# Patient Record
Sex: FEMALE | Race: WHITE | NOT HISPANIC OR LATINO | Employment: FULL TIME | URBAN - METROPOLITAN AREA
[De-identification: names, ages, dates, MRNs, and addresses within clinical notes are randomized per-mention and may not be internally consistent; named-entity substitution may affect disease eponyms.]

---

## 2023-04-12 LAB
ALANINE AMINOTRANSFERASE (SGPT) (U/L) IN SER/PLAS: 54 U/L (ref 7–45)
ALBUMIN (G/DL) IN SER/PLAS: 4.1 G/DL (ref 3.4–5)
ALKALINE PHOSPHATASE (U/L) IN SER/PLAS: 61 U/L (ref 33–110)
ASPARTATE AMINOTRANSFERASE (SGOT) (U/L) IN SER/PLAS: 30 U/L (ref 9–39)
BILIRUBIN DIRECT (MG/DL) IN SER/PLAS: 0.1 MG/DL (ref 0–0.3)
BILIRUBIN TOTAL (MG/DL) IN SER/PLAS: 0.5 MG/DL (ref 0–1.2)
CHOLESTEROL (MG/DL) IN SER/PLAS: 177 MG/DL (ref 0–199)
CHOLESTEROL IN HDL (MG/DL) IN SER/PLAS: 58.4 MG/DL
CHOLESTEROL/HDL RATIO: 3
LDL: 94 MG/DL (ref 0–99)
PROTEIN TOTAL: 6.9 G/DL (ref 6.4–8.2)
TRIGLYCERIDE (MG/DL) IN SER/PLAS: 125 MG/DL (ref 0–149)
VLDL: 25 MG/DL (ref 0–40)

## 2023-04-16 DIAGNOSIS — E03.9 HYPOTHYROIDISM, UNSPECIFIED: ICD-10-CM

## 2023-04-17 ENCOUNTER — OFFICE VISIT (OUTPATIENT)
Dept: PRIMARY CARE | Facility: CLINIC | Age: 58
End: 2023-04-17
Payer: COMMERCIAL

## 2023-04-17 VITALS
DIASTOLIC BLOOD PRESSURE: 86 MMHG | BODY MASS INDEX: 32.64 KG/M2 | HEIGHT: 64 IN | HEART RATE: 81 BPM | WEIGHT: 191.2 LBS | OXYGEN SATURATION: 97 % | SYSTOLIC BLOOD PRESSURE: 160 MMHG

## 2023-04-17 DIAGNOSIS — I10 PRIMARY HYPERTENSION: ICD-10-CM

## 2023-04-17 DIAGNOSIS — E78.2 MIXED HYPERLIPIDEMIA: ICD-10-CM

## 2023-04-17 DIAGNOSIS — I25.118 CORONARY ARTERY DISEASE OF NATIVE ARTERY OF NATIVE HEART WITH STABLE ANGINA PECTORIS (CMS-HCC): Primary | ICD-10-CM

## 2023-04-17 DIAGNOSIS — H69.92 DYSFUNCTION OF LEFT EUSTACHIAN TUBE: ICD-10-CM

## 2023-04-17 PROBLEM — G47.30 SLEEP APNEA, UNSPECIFIED: Status: ACTIVE | Noted: 2023-04-17

## 2023-04-17 PROBLEM — J45.909 REACTIVE AIRWAY DISEASE, UNSPECIFIED ASTHMA SEVERITY, UNCOMPLICATED (HHS-HCC): Status: ACTIVE | Noted: 2023-04-17

## 2023-04-17 PROBLEM — D69.6 THROMBOCYTOPENIA (CMS-HCC): Status: ACTIVE | Noted: 2023-04-17

## 2023-04-17 PROBLEM — J30.9 ALLERGIC RHINITIS: Status: ACTIVE | Noted: 2023-04-17

## 2023-04-17 PROBLEM — F32.A DEPRESSION: Status: ACTIVE | Noted: 2023-04-17

## 2023-04-17 PROBLEM — E78.00 HYPERCHOLESTEROLEMIA: Status: ACTIVE | Noted: 2023-04-17

## 2023-04-17 PROBLEM — I20.9 ANGINA PECTORIS (CMS-HCC): Status: ACTIVE | Noted: 2023-04-17

## 2023-04-17 PROBLEM — E03.9 HYPOTHYROIDISM: Status: ACTIVE | Noted: 2023-04-17

## 2023-04-17 PROBLEM — B00.2 ORAL HERPES: Status: ACTIVE | Noted: 2023-04-17

## 2023-04-17 PROBLEM — E55.9 VITAMIN D DEFICIENCY: Status: ACTIVE | Noted: 2023-04-17

## 2023-04-17 PROBLEM — E78.5 HYPERLIPIDEMIA: Status: ACTIVE | Noted: 2023-04-17

## 2023-04-17 PROBLEM — I45.10 INCOMPLETE RBBB: Status: ACTIVE | Noted: 2023-04-17

## 2023-04-17 PROBLEM — M1A.9XX0 CHRONIC GOUT: Status: ACTIVE | Noted: 2023-04-17

## 2023-04-17 PROCEDURE — 1036F TOBACCO NON-USER: CPT | Performed by: INTERNAL MEDICINE

## 2023-04-17 PROCEDURE — 3077F SYST BP >= 140 MM HG: CPT | Performed by: INTERNAL MEDICINE

## 2023-04-17 PROCEDURE — 3079F DIAST BP 80-89 MM HG: CPT | Performed by: INTERNAL MEDICINE

## 2023-04-17 PROCEDURE — 99214 OFFICE O/P EST MOD 30 MIN: CPT | Performed by: INTERNAL MEDICINE

## 2023-04-17 RX ORDER — LISINOPRIL AND HYDROCHLOROTHIAZIDE 20; 25 MG/1; MG/1
1 TABLET ORAL DAILY
COMMUNITY
End: 2023-10-20 | Stop reason: SDUPTHER

## 2023-04-17 RX ORDER — LEVOTHYROXINE SODIUM 50 UG/1
50 TABLET ORAL DAILY
COMMUNITY
End: 2023-04-24 | Stop reason: SDUPTHER

## 2023-04-17 RX ORDER — ROSUVASTATIN CALCIUM 10 MG/1
10 TABLET, COATED ORAL DAILY
COMMUNITY
Start: 2023-04-15 | End: 2023-04-17 | Stop reason: SDUPTHER

## 2023-04-17 RX ORDER — ISOSORBIDE MONONITRATE 30 MG/1
15 TABLET, EXTENDED RELEASE ORAL DAILY
COMMUNITY
End: 2024-04-08 | Stop reason: ALTCHOICE

## 2023-04-17 RX ORDER — CLOTRIMAZOLE AND BETAMETHASONE DIPROPIONATE 10; .64 MG/G; MG/G
1 CREAM TOPICAL 2 TIMES DAILY
COMMUNITY
Start: 2022-06-02 | End: 2023-04-17 | Stop reason: ALTCHOICE

## 2023-04-17 RX ORDER — NITROGLYCERIN 0.4 MG/1
0.4 TABLET SUBLINGUAL EVERY 5 MIN PRN
COMMUNITY
Start: 2020-04-01

## 2023-04-17 RX ORDER — TICAGRELOR 90 MG/1
1 TABLET ORAL 2 TIMES DAILY
COMMUNITY
Start: 2019-10-10 | End: 2023-04-17 | Stop reason: ALTCHOICE

## 2023-04-17 RX ORDER — ACETAMINOPHEN 500 MG
125 TABLET ORAL DAILY
COMMUNITY

## 2023-04-17 RX ORDER — ROSUVASTATIN CALCIUM 20 MG/1
20 TABLET, COATED ORAL DAILY
Qty: 90 TABLET | Refills: 3 | Status: SHIPPED | OUTPATIENT
Start: 2023-04-17 | End: 2023-10-14

## 2023-04-17 RX ORDER — ATORVASTATIN CALCIUM 10 MG/1
10 TABLET, FILM COATED ORAL NIGHTLY
COMMUNITY
End: 2023-04-17 | Stop reason: ALTCHOICE

## 2023-04-17 RX ORDER — FLUTICASONE PROPIONATE 50 MCG
2 SPRAY, SUSPENSION (ML) NASAL DAILY
COMMUNITY

## 2023-04-17 RX ORDER — AMOXICILLIN 500 MG/1
2000 CAPSULE ORAL AS NEEDED
COMMUNITY
Start: 2022-12-06 | End: 2023-11-02 | Stop reason: WASHOUT

## 2023-04-17 RX ORDER — VALACYCLOVIR HYDROCHLORIDE 1 G/1
2000 TABLET, FILM COATED ORAL 2 TIMES DAILY
COMMUNITY
Start: 2023-01-22 | End: 2023-11-02 | Stop reason: WASHOUT

## 2023-04-17 RX ORDER — EPINEPHRINE 0.22MG
1 AEROSOL WITH ADAPTER (ML) INHALATION 2 TIMES DAILY
COMMUNITY
Start: 2018-08-31

## 2023-04-17 RX ORDER — ROSUVASTATIN CALCIUM 10 MG/1
20 TABLET, COATED ORAL DAILY
Qty: 90 TABLET | Refills: 3 | Status: SHIPPED | OUTPATIENT
Start: 2023-04-17 | End: 2023-04-17 | Stop reason: ALTCHOICE

## 2023-04-17 ASSESSMENT — ENCOUNTER SYMPTOMS
FATIGUE: 0
VOMITING: 0
DIARRHEA: 0
DYSURIA: 0
ARTHRALGIAS: 0
COUGH: 0
SORE THROAT: 0
FREQUENCY: 0
CONSTIPATION: 0
PALPITATIONS: 0
LIGHT-HEADEDNESS: 0
MYALGIAS: 0
SHORTNESS OF BREATH: 0
CHILLS: 0
DIFFICULTY URINATING: 0
HEADACHES: 0
NAUSEA: 0
DIZZINESS: 0
WEAKNESS: 0
HEMATURIA: 0
FEVER: 0

## 2023-04-17 ASSESSMENT — PATIENT HEALTH QUESTIONNAIRE - PHQ9
SUM OF ALL RESPONSES TO PHQ9 QUESTIONS 1 AND 2: 0
1. LITTLE INTEREST OR PLEASURE IN DOING THINGS: NOT AT ALL
2. FEELING DOWN, DEPRESSED OR HOPELESS: NOT AT ALL

## 2023-04-17 ASSESSMENT — PAIN SCALES - GENERAL: PAINLEVEL: 2

## 2023-04-17 NOTE — ASSESSMENT & PLAN NOTE
Poor blood pressure control today.  Patient did take her meds this morning but says she was anxious because of all the construction on [address] getting to my office.  She also says she is only had half as much coffee is normal.  At this time I would like her to check her blood pressure readings a few times per week and send them to me in 2 weeks to make sure she is not consistently at this level.  Patient agrees and will send me her blood pressure readings from home so we can see if we need to adjust her meds

## 2023-04-17 NOTE — PROGRESS NOTES
Subjective   Patient ID: Kennedi Machado is a 57 y.o. female who presents for 4 month re for HTN management and patient state she want her left ear rechecked because the eusatachian tube is bulging causing issues.  BN    Patient is here today for routine follow-up on her hypertension, heart disease cholesterol and medication management.  At her last appointment we stopped atorvastatin and started her on rosuvastatin 10 mg daily.  Not only has her cholesterol improved but she no longer has the leg cramps and is actually feeling better.  Labs were reviewed with the patient and her LDL is down to 94.  Although improved it is still not yet at goal since she has known heart disease        Review of Systems   Constitutional:  Negative for chills, fatigue and fever.   HENT:  Negative for sore throat.         Patient complains of fullness in the left ear and a popping sensation.  When she tries to pop her eardrums she cannot do it on the left side.   Eyes:  Negative for visual disturbance.   Respiratory:  Negative for cough and shortness of breath.    Cardiovascular:  Negative for chest pain, palpitations and leg swelling.   Gastrointestinal:  Negative for constipation, diarrhea, nausea and vomiting.   Genitourinary:  Negative for difficulty urinating, dysuria, frequency, hematuria and urgency.   Musculoskeletal:  Negative for arthralgias and myalgias.   Skin:  Negative for rash.   Neurological:  Negative for dizziness, syncope, weakness, light-headedness and headaches.       Objective   Physical Exam  Constitutional:       Appearance: Normal appearance.   HENT:      Head: Normocephalic and atraumatic.      Nose: Nose normal.   Eyes:      Extraocular Movements: Extraocular movements intact.      Pupils: Pupils are equal, round, and reactive to light.   Cardiovascular:      Rate and Rhythm: Normal rate and regular rhythm.   Pulmonary:      Breath sounds: Normal breath sounds.   Abdominal:      General: Abdomen is flat. Bowel  "sounds are normal.      Palpations: Abdomen is soft.   Musculoskeletal:      Right lower leg: No edema.      Left lower leg: No edema.   Neurological:      Mental Status: She is alert.     /86 (BP Location: Left arm, Patient Position: Sitting)   Pulse 81   Ht 1.613 m (5' 3.5\")   Wt 86.7 kg (191 lb 3.2 oz)   SpO2 97%   BMI 33.34 kg/m²       Assessment/Plan   Problem List Items Addressed This Visit          Circulatory    Coronary artery disease of native heart with stable angina pectoris (CMS/HCC) - Primary     Patient has known heart disease and we are working diligently to get her blood pressure down and her cholesterol to goal.  So far she is tolerating the higher dose of Crestor or rosuvastatin but I would like to push it to see if we can get her down below an LDL of 70 or 75         Relevant Medications    isosorbide mononitrate ER (Imdur) 30 mg 24 hr tablet    nitroglycerin (Nitrostat) 0.4 mg SL tablet    rosuvastatin (Crestor) 20 mg tablet    Other Relevant Orders    Lipid Panel    Hepatic Function Panel    Hypertension     Poor blood pressure control today.  Patient did take her meds this morning but says she was anxious because of all the construction on [address] getting to my office.  She also says she is only had half as much coffee is normal.  At this time I would like her to check her blood pressure readings a few times per week and send them to me in 2 weeks to make sure she is not consistently at this level.  Patient agrees and will send me her blood pressure readings from home so we can see if we need to adjust her meds            Other    Hyperlipidemia    Relevant Medications    rosuvastatin (Crestor) 20 mg tablet    Other Relevant Orders    Lipid Panel    Hepatic Function Panel    Dysfunction of left eustachian tube              It has been a pleasure seeing you.  "

## 2023-04-17 NOTE — PATIENT INSTRUCTIONS
Check bp's at home and send to Dr DIXON in 2 weeks    Increase crestor to 20 mg a day     Repeat fasting labs in 3 months    Follow up Dr Sauceda in 4 months    Try Astapro nasal spray 2 puffs each nostril at night and flonase in AM

## 2023-04-17 NOTE — ASSESSMENT & PLAN NOTE
Patient has known heart disease and we are working diligently to get her blood pressure down and her cholesterol to goal.  So far she is tolerating the higher dose of Crestor or rosuvastatin but I would like to push it to see if we can get her down below an LDL of 70 or 75

## 2023-04-24 DIAGNOSIS — E03.9 HYPOTHYROIDISM, UNSPECIFIED TYPE: Primary | ICD-10-CM

## 2023-04-24 RX ORDER — LEVOTHYROXINE SODIUM 50 UG/1
50 TABLET ORAL DAILY
Qty: 90 TABLET | Refills: 0 | Status: SHIPPED | OUTPATIENT
Start: 2023-04-24 | End: 2023-07-24

## 2023-05-11 RX ORDER — LEVOTHYROXINE SODIUM 50 UG/1
TABLET ORAL
Qty: 30 TABLET | Refills: 11 | OUTPATIENT
Start: 2023-05-11

## 2023-06-20 ENCOUNTER — OFFICE (OUTPATIENT)
Dept: URBAN - METROPOLITAN AREA CLINIC 27 | Facility: CLINIC | Age: 58
End: 2023-06-20

## 2023-06-20 VITALS
HEART RATE: 72 BPM | SYSTOLIC BLOOD PRESSURE: 151 MMHG | WEIGHT: 194 LBS | DIASTOLIC BLOOD PRESSURE: 72 MMHG | HEIGHT: 62 IN | TEMPERATURE: 97.9 F

## 2023-06-20 DIAGNOSIS — K21.9 GASTRO-ESOPHAGEAL REFLUX DISEASE WITHOUT ESOPHAGITIS: ICD-10-CM

## 2023-06-20 DIAGNOSIS — Z86.010 PERSONAL HISTORY OF COLONIC POLYPS: ICD-10-CM

## 2023-06-20 DIAGNOSIS — Z80.0 FAMILY HISTORY OF MALIGNANT NEOPLASM OF DIGESTIVE ORGANS: ICD-10-CM

## 2023-06-20 PROCEDURE — 99214 OFFICE O/P EST MOD 30 MIN: CPT | Performed by: INTERNAL MEDICINE

## 2023-07-23 DIAGNOSIS — E03.9 HYPOTHYROIDISM, UNSPECIFIED TYPE: ICD-10-CM

## 2023-07-24 RX ORDER — LEVOTHYROXINE SODIUM 50 UG/1
50 TABLET ORAL DAILY
Qty: 30 TABLET | Refills: 0 | Status: SHIPPED | OUTPATIENT
Start: 2023-07-24 | End: 2023-08-18

## 2023-07-29 ENCOUNTER — LAB (OUTPATIENT)
Dept: LAB | Facility: LAB | Age: 58
End: 2023-07-29
Payer: COMMERCIAL

## 2023-07-29 DIAGNOSIS — E78.2 MIXED HYPERLIPIDEMIA: ICD-10-CM

## 2023-07-29 DIAGNOSIS — I25.118 CORONARY ARTERY DISEASE OF NATIVE ARTERY OF NATIVE HEART WITH STABLE ANGINA PECTORIS (CMS-HCC): ICD-10-CM

## 2023-07-29 LAB
ALANINE AMINOTRANSFERASE (SGPT) (U/L) IN SER/PLAS: 49 U/L (ref 7–45)
ALBUMIN (G/DL) IN SER/PLAS: 4.1 G/DL (ref 3.4–5)
ALKALINE PHOSPHATASE (U/L) IN SER/PLAS: 66 U/L (ref 33–110)
ASPARTATE AMINOTRANSFERASE (SGOT) (U/L) IN SER/PLAS: 28 U/L (ref 9–39)
BILIRUBIN DIRECT (MG/DL) IN SER/PLAS: 0.1 MG/DL (ref 0–0.3)
BILIRUBIN TOTAL (MG/DL) IN SER/PLAS: 0.5 MG/DL (ref 0–1.2)
CHOLESTEROL (MG/DL) IN SER/PLAS: 163 MG/DL (ref 0–199)
CHOLESTEROL IN HDL (MG/DL) IN SER/PLAS: 55.4 MG/DL
CHOLESTEROL/HDL RATIO: 2.9
LDL: 79 MG/DL (ref 0–99)
PROTEIN TOTAL: 6.9 G/DL (ref 6.4–8.2)
TRIGLYCERIDE (MG/DL) IN SER/PLAS: 141 MG/DL (ref 0–149)
VLDL: 28 MG/DL (ref 0–40)

## 2023-07-29 PROCEDURE — 36415 COLL VENOUS BLD VENIPUNCTURE: CPT

## 2023-07-29 PROCEDURE — 80061 LIPID PANEL: CPT

## 2023-07-29 PROCEDURE — 80076 HEPATIC FUNCTION PANEL: CPT

## 2023-07-31 ENCOUNTER — TELEPHONE (OUTPATIENT)
Dept: PRIMARY CARE | Facility: CLINIC | Age: 58
End: 2023-07-31
Payer: COMMERCIAL

## 2023-07-31 NOTE — TELEPHONE ENCOUNTER
Patient notified and read message.  BN     ----- Message from Dea Sauceda MD sent at 7/31/2023  8:03 AM EDT -----  Please notify patient her cholesterol is better.  Her LDL or bad cholesterol have dropped from 94 down to 79 and her liver enzymes remain the same.  Please keep your appointment later in August and I will review this with you in detail in person.

## 2023-08-25 ENCOUNTER — OFFICE VISIT (OUTPATIENT)
Dept: PRIMARY CARE | Facility: CLINIC | Age: 58
End: 2023-08-25
Payer: COMMERCIAL

## 2023-08-25 VITALS
HEIGHT: 63 IN | WEIGHT: 190.8 LBS | SYSTOLIC BLOOD PRESSURE: 127 MMHG | HEART RATE: 66 BPM | BODY MASS INDEX: 33.81 KG/M2 | OXYGEN SATURATION: 97 % | DIASTOLIC BLOOD PRESSURE: 64 MMHG

## 2023-08-25 DIAGNOSIS — Z12.31 SCREENING MAMMOGRAM FOR BREAST CANCER: ICD-10-CM

## 2023-08-25 DIAGNOSIS — F32.9 CURRENT EPISODE OF MAJOR DEPRESSIVE DISORDER WITHOUT PRIOR EPISODE, UNSPECIFIED DEPRESSION EPISODE SEVERITY: ICD-10-CM

## 2023-08-25 DIAGNOSIS — Z00.00 ANNUAL PHYSICAL EXAM: Primary | ICD-10-CM

## 2023-08-25 DIAGNOSIS — E78.2 MIXED HYPERLIPIDEMIA: ICD-10-CM

## 2023-08-25 DIAGNOSIS — I25.118 CORONARY ARTERY DISEASE OF NATIVE ARTERY OF NATIVE HEART WITH STABLE ANGINA PECTORIS (CMS-HCC): ICD-10-CM

## 2023-08-25 DIAGNOSIS — H69.92 DYSFUNCTION OF LEFT EUSTACHIAN TUBE: ICD-10-CM

## 2023-08-25 DIAGNOSIS — M1A.9XX0 CHRONIC GOUT WITHOUT TOPHUS, UNSPECIFIED CAUSE, UNSPECIFIED SITE: ICD-10-CM

## 2023-08-25 DIAGNOSIS — E03.9 HYPOTHYROIDISM, UNSPECIFIED TYPE: ICD-10-CM

## 2023-08-25 DIAGNOSIS — H69.92 CHRONIC DYSFUNCTION OF LEFT EUSTACHIAN TUBE: ICD-10-CM

## 2023-08-25 DIAGNOSIS — I10 PRIMARY HYPERTENSION: ICD-10-CM

## 2023-08-25 PROCEDURE — 3074F SYST BP LT 130 MM HG: CPT | Performed by: INTERNAL MEDICINE

## 2023-08-25 PROCEDURE — 99214 OFFICE O/P EST MOD 30 MIN: CPT | Performed by: INTERNAL MEDICINE

## 2023-08-25 PROCEDURE — 1036F TOBACCO NON-USER: CPT | Performed by: INTERNAL MEDICINE

## 2023-08-25 PROCEDURE — 3078F DIAST BP <80 MM HG: CPT | Performed by: INTERNAL MEDICINE

## 2023-08-25 ASSESSMENT — ENCOUNTER SYMPTOMS
WEAKNESS: 0
FREQUENCY: 0
CHILLS: 0
DIARRHEA: 0
HEMATURIA: 0
COUGH: 0
PALPITATIONS: 0
DIFFICULTY URINATING: 0
MYALGIAS: 0
SHORTNESS OF BREATH: 0
LIGHT-HEADEDNESS: 0
ARTHRALGIAS: 0
DYSURIA: 0
VOMITING: 0
CONSTIPATION: 0
NAUSEA: 0
FATIGUE: 0
SORE THROAT: 0
DIZZINESS: 0
HEADACHES: 0
FEVER: 0

## 2023-08-25 ASSESSMENT — PAIN SCALES - GENERAL: PAINLEVEL: 0-NO PAIN

## 2023-08-25 NOTE — PATIENT INSTRUCTIONS
Get fasting labs in Dec before next appointment    Get mammo now    Follow up Dr Sauceda in 4 months for HTN CAD etc

## 2023-08-25 NOTE — ASSESSMENT & PLAN NOTE
Cholesterol levels are also improving.  She is not at the LDL goal of 70 but it has clearly gone down so she is encouraged to continue working on low-fat diet and exercise

## 2023-08-25 NOTE — PROGRESS NOTES
Subjective   Patient ID: Kennedi Machado is a 57 y.o. female who presents for No chief complaint on file..  Patient is here today for routine follow-up hypertension heart disease and medication management.  Her only complaint is her left ear is causing pain again and she may thinks it may be related to eustachian tube dysfunction which she has had in the past but is concerned and wants to make sure it is not infected.        Review of Systems   Constitutional:  Negative for chills, fatigue and fever.   HENT:  Positive for ear pain. Negative for sore throat.    Eyes:  Negative for visual disturbance.   Respiratory:  Negative for cough and shortness of breath.    Cardiovascular:  Negative for chest pain, palpitations and leg swelling.   Gastrointestinal:  Negative for constipation, diarrhea, nausea and vomiting.   Genitourinary:  Negative for difficulty urinating, dysuria, frequency, hematuria and urgency.   Musculoskeletal:  Negative for arthralgias and myalgias.   Skin:  Negative for rash.   Neurological:  Negative for dizziness, syncope, weakness, light-headedness and headaches.       Objective   Medication Documentation Review Audit       Reviewed by Dea Sauceda MD (Physician) on 08/25/23 at 0831      Medication Order Taking? Sig Documenting Provider Last Dose Status   amoxicillin (Amoxil) 500 mg capsule [id number] No Take 4 capsules (2,000 mg) by mouth if needed. Take 60 min before procedure. Historical Provider, MD Taking Active   aspirin-calcium carbonate 81 mg-300 mg calcium(777 mg) tablet [id number] No Take by mouth. Historical Provider, MD Taking Active   cholecalciferol (Vitamin D-3) 5,000 Units tablet [id number] No Take 1 tablet (125 mcg) by mouth once daily. Historical Provider, MD Taking Active   coenzyme Q-10 100 mg capsule [id number] No Take 1 capsule (100 mg) by mouth in the morning and 1 capsule (100 mg) before bedtime. Historical Provider, MD Taking Active   fluticasone (Flonase) 50 mcg/actuation nasal  spray [id number] No Administer 2 sprays into each nostril once daily. Historical Provider, MD Taking Active   isosorbide mononitrate ER (Imdur) 30 mg 24 hr tablet [id number] No Take 1 tablet (30 mg) by mouth once daily. Historical Provider, MD Taking Active   levothyroxine (Synthroid, Levoxyl) 50 mcg tablet [id number]  TAKE 1 TABLET (50 MCG) BY MOUTH ONCE DAILY FOR 90 DOSES. Dea Sauceda MD  Active   lisinopriL-hydrochlorothiazide 20-25 mg tablet [id number] No Take 1 tablet by mouth once daily. Historical Provider, MD Taking Active   nitroglycerin (Nitrostat) 0.4 mg SL tablet [id number] No Place 1 tablet (0.4 mg) under the tongue every 5 minutes if needed. Historical Provider, MD Taking Active   rosuvastatin (Crestor) 20 mg tablet [id number]  Take 1 tablet (20 mg) by mouth once daily. Dea Sauceda MD  Active   valACYclovir (Valtrex) 1 gram tablet [id number] No Take 2 tablets (2,000 mg) by mouth in the morning and 2 tablets (2,000 mg) before bedtime. Take at first sign of lip lesion. Historical Provider, MD Taking Active                  Physical Exam  Constitutional:       Appearance: Normal appearance.   HENT:      Head: Normocephalic and atraumatic.      Right Ear: Tympanic membrane and ear canal normal.      Left Ear: Ear canal normal.      Ears:      Comments: Left tympanic membrane is clear shiny and has good left and anterior light reflex.  Eardrum has no erythema no air-fluid levels no evidence of infection but it is slightly bulging as if it is under pressure     Nose: Nose normal.   Eyes:      Extraocular Movements: Extraocular movements intact.      Pupils: Pupils are equal, round, and reactive to light.   Cardiovascular:      Rate and Rhythm: Normal rate and regular rhythm.   Pulmonary:      Breath sounds: Normal breath sounds.   Abdominal:      General: Abdomen is flat. Bowel sounds are normal.      Palpations: Abdomen is soft.   Musculoskeletal:      Right lower leg: No edema.      Left lower leg: No edema.  "  Neurological:      Mental Status: She is alert.     /64 (BP Location: Left arm, Patient Position: Sitting)   Pulse 66   Ht 1.6 m (5' 3\") Comment: w/o shoes  Wt 86.5 kg (190 lb 12.8 oz)   SpO2 97%   BMI 33.80 kg/m²       Assessment/Plan   Problem List Items Addressed This Visit       Chronic gout    Relevant Orders    Lipid Panel    CBC    Comprehensive Metabolic Panel    TSH with reflex to Free T4 if abnormal    Vitamin D, Total    Coronary artery disease of native heart with stable angina pectoris (CMS/HCC)     Patient sees her cardiologist today but denies angina or any chest pain pains or complaints.         Relevant Orders    Lipid Panel    CBC    Comprehensive Metabolic Panel    TSH with reflex to Free T4 if abnormal    Vitamin D, Total    Depression     Mood is stable and she is not on medications and appears to be doing well at this time         Relevant Orders    Lipid Panel    CBC    Comprehensive Metabolic Panel    TSH with reflex to Free T4 if abnormal    Vitamin D, Total    Hyperlipidemia     Cholesterol levels are also improving.  She is not at the LDL goal of 70 but it has clearly gone down so she is encouraged to continue working on low-fat diet and exercise         Relevant Orders    Lipid Panel    CBC    Comprehensive Metabolic Panel    TSH with reflex to Free T4 if abnormal    Vitamin D, Total    Hypertension     Hypertension is stable and very well controlled.  She needs no refills.         Relevant Orders    Lipid Panel    CBC    Comprehensive Metabolic Panel    TSH with reflex to Free T4 if abnormal    Vitamin D, Total    Hypothyroidism    Relevant Orders    Lipid Panel    CBC    Comprehensive Metabolic Panel    TSH with reflex to Free T4 if abnormal    Vitamin D, Total    Dysfunction of left eustachian tube    Chronic dysfunction of left eustachian tube     Patient has chronic left eustachian tube dysfunction.  She is already on Astepro and Flonase which I encouraged her to " continue taking.  She was told that if it hurts too much she could use Afrin nasal spray but I recommended that she does not use it more than 1 spray each nostril every third day.  I explained that this medication is highly addictive to the nasal mucosa and she says if the pain gets too bad she will consider that but otherwise she may just learned to live with it.  We also discussed seeing an allergist which she declines at this time but if it worsens will let me know          Other Visit Diagnoses       Annual physical exam    -  Primary    Relevant Orders    Lipid Panel    CBC    Comprehensive Metabolic Panel    TSH with reflex to Free T4 if abnormal    Vitamin D, Total    Screening mammogram for breast cancer        Relevant Orders    BI mammo bilateral screening tomosynthesis          Follow-up with me will be in 4 months with annual blood work prior to that appointment  Patient is due for mammogram now and was given an order           It has been a pleasure seeing you.

## 2023-08-25 NOTE — ASSESSMENT & PLAN NOTE
Patient has chronic left eustachian tube dysfunction.  She is already on Astepro and Flonase which I encouraged her to continue taking.  She was told that if it hurts too much she could use Afrin nasal spray but I recommended that she does not use it more than 1 spray each nostril every third day.  I explained that this medication is highly addictive to the nasal mucosa and she says if the pain gets too bad she will consider that but otherwise she may just learned to live with it.  We also discussed seeing an allergist which she declines at this time but if it worsens will let me know

## 2023-10-09 ENCOUNTER — ANCILLARY PROCEDURE (OUTPATIENT)
Dept: RADIOLOGY | Facility: CLINIC | Age: 58
End: 2023-10-09
Payer: COMMERCIAL

## 2023-10-09 DIAGNOSIS — Z12.31 SCREENING MAMMOGRAM FOR BREAST CANCER: ICD-10-CM

## 2023-10-09 PROCEDURE — 77067 SCR MAMMO BI INCL CAD: CPT | Mod: BILATERAL PROCEDURE | Performed by: RADIOLOGY

## 2023-10-09 PROCEDURE — 77067 SCR MAMMO BI INCL CAD: CPT | Mod: 50

## 2023-10-09 PROCEDURE — 77063 BREAST TOMOSYNTHESIS BI: CPT | Mod: BILATERAL PROCEDURE | Performed by: RADIOLOGY

## 2023-11-03 ENCOUNTER — OFFICE VISIT (OUTPATIENT)
Dept: CARDIOLOGY | Facility: CLINIC | Age: 58
End: 2023-11-03
Payer: COMMERCIAL

## 2023-11-03 VITALS
SYSTOLIC BLOOD PRESSURE: 140 MMHG | BODY MASS INDEX: 34.38 KG/M2 | OXYGEN SATURATION: 98 % | WEIGHT: 194 LBS | HEART RATE: 68 BPM | DIASTOLIC BLOOD PRESSURE: 80 MMHG | HEIGHT: 63 IN

## 2023-11-03 DIAGNOSIS — E78.49 OTHER HYPERLIPIDEMIA: ICD-10-CM

## 2023-11-03 DIAGNOSIS — I10 PRIMARY HYPERTENSION: ICD-10-CM

## 2023-11-03 DIAGNOSIS — I25.118 CORONARY ARTERY DISEASE OF NATIVE ARTERY OF NATIVE HEART WITH STABLE ANGINA PECTORIS (CMS-HCC): Primary | ICD-10-CM

## 2023-11-03 PROCEDURE — 3079F DIAST BP 80-89 MM HG: CPT | Performed by: INTERNAL MEDICINE

## 2023-11-03 PROCEDURE — 1036F TOBACCO NON-USER: CPT | Performed by: INTERNAL MEDICINE

## 2023-11-03 PROCEDURE — 3077F SYST BP >= 140 MM HG: CPT | Performed by: INTERNAL MEDICINE

## 2023-11-03 PROCEDURE — 99214 OFFICE O/P EST MOD 30 MIN: CPT | Performed by: INTERNAL MEDICINE

## 2023-11-03 RX ORDER — ATENOLOL 25 MG/1
12.5 TABLET ORAL DAILY
Qty: 15 TABLET | Refills: 11 | Status: SHIPPED | OUTPATIENT
Start: 2023-11-03 | End: 2024-11-02

## 2023-11-03 RX ORDER — ASPIRIN 81 MG/1
81 TABLET ORAL DAILY
COMMUNITY

## 2023-11-03 NOTE — PROGRESS NOTES
[facility] Cardiology Outpatient Follow-up Visit     Reason for Visit: CAD    HPI: Kennedi Machado is a 57 y.o.  female who presents today for followup.     The patient is a 57-year-old female with a history of coronary artery disease status post stent implantation who presents with chest discomfort. She admits to an occasional chest discomfort.  She admits to an occasional palpitation.  She denies any shortness of breath.  She denies any dyspnea on exertion. She denies any lightheadedness, syncope, orthopnea, PND, lower extremity edema.      She decreased her isosorbide to 15 mg daily.  She has been a little more symptomatic since that time.     5/21/2020 cardiac catheterization: Patent proximal LAD stent, 80% ostial OM1 stenosis suitable for medical therapy, mild RCA disease, normal left heart filling pressures. 10/3/2019 cardiac catheterization: ROSA of the proximal LAD. 9/8/2022 MPI: Normal perfusion, ejection fraction 83%. 9/8/2022 ECG: Normal sinus rhythm, RSR pattern. 10/10/2022 catheterization: Stable CAD since 2020. Patent LAD stent. Mild circumflex and RCA disease. Branch vessel disease of OM1 suitable for medical therapy based on vessel size and lesion location.  7/29/2023 , LDL 79, HDL 55, triglycerides 141.    Past Medical History:   She has a past medical history of Abnormal levels of other serum enzymes (12/01/2019), Acute maxillary sinusitis, unspecified (12/20/2019), Allergy, unspecified, initial encounter (05/24/2020), Chondrocostal junction syndrome (tietze) (05/14/2018), Chronic sinusitis, unspecified (04/12/2018), Complication of other artery following a procedure, not elsewhere classified, initial encounter (10/24/2019), Contact with and (suspected) exposure to asbestos (05/14/2018), Encounter for follow-up examination after completed treatment for conditions other than malignant neoplasm (10/07/2019), Encounter for screening for malignant neoplasm of skin (12/28/2018), Low back pain,  unspecified (03/15/2019), Muscle spasm of back (03/15/2019), Other conditions influencing health status (01/05/2021), Other injury of unspecified body region, initial encounter (10/07/2019), Other specified abnormal findings of blood chemistry (08/29/2019), Personal history of colonic polyps (06/04/2020), Personal history of diseases of the blood and blood-forming organs and certain disorders involving the immune mechanism (06/30/2020), Personal history of other benign neoplasm (12/28/2018), Personal history of other diseases of the circulatory system (10/24/2019), Personal history of other diseases of the musculoskeletal system and connective tissue (03/04/2019), Personal history of other diseases of the musculoskeletal system and connective tissue (03/15/2019), Personal history of other diseases of the respiratory system, Personal history of other diseases of the respiratory system (11/17/2021), Personal history of other diseases of the respiratory system (02/03/2021), Personal history of other diseases of the respiratory system (12/13/2017), Personal history of other diseases of urinary system, Personal history of other endocrine, nutritional and metabolic disease (01/15/2019), Personal history of other specified conditions (10/28/2019), Personal history of other specified conditions (01/09/2020), Personal history of other specified conditions (01/09/2020), Personal history of other specified conditions (04/01/2020), Personal history of other specified conditions (09/19/2019), and Sleep related leg cramps (01/09/2020).    Surgical History:   She has a past surgical history that includes Cholecystectomy (05/14/2018); Hysterectomy (05/14/2018); Appendectomy (05/14/2018); Other surgical history (11/09/2022); Other surgical history (09/23/2022); Other surgical history (09/23/2022); Other surgical history (09/23/2022); Colonoscopy (10/21/2020); and [place] tooth extraction.    Family History:   Family History   Problem  "Relation Name Age of Onset    Colon cancer Mother      Asthma Mother      Emphysema Father      Other (dm2) Brother      Other (waldonstrom macroglobulinemia) Brother         Allergies:  Bisoprolol-hydrochlorothiazide, Ranolazine, Sulfa (sulfonamide antibiotics), and Diclofenac     Social History:   No cigarettes, social alcohol, no drugs    Prior Cardiovascular Testing (Personally Reviewed):     Review of Systems:  Review of Systems   All other systems reviewed and are negative.      Outpatient Medications:    Current Outpatient Medications:     aspirin 81 mg EC tablet, Take 1 tablet (81 mg) by mouth once daily., Disp: , Rfl:     cholecalciferol (Vitamin D-3) 5,000 Units tablet, Take 1 tablet (125 mcg) by mouth once daily., Disp: , Rfl:     coenzyme Q-10 100 mg capsule, Take 1 capsule (100 mg) by mouth 2 times a day., Disp: , Rfl:     fluticasone (Flonase) 50 mcg/actuation nasal spray, Administer 2 sprays into each nostril once daily., Disp: , Rfl:     isosorbide mononitrate ER (Imdur) 30 mg 24 hr tablet, Take 1 tablet (30 mg) by mouth once daily., Disp: , Rfl:     levothyroxine (Synthroid, Levoxyl) 50 mcg tablet, TAKE 1 TABLET BY MOUTH ONCE DAILY., Disp: 90 tablet, Rfl: 0    lisinopriL-hydrochlorothiazide 20-25 mg tablet, Take 1 tablet by mouth once daily., Disp: 90 tablet, Rfl: 0    nitroglycerin (Nitrostat) 0.4 mg SL tablet, Place 1 tablet (0.4 mg) under the tongue every 5 minutes if needed., Disp: , Rfl:     rosuvastatin (Crestor) 20 mg tablet, Take 1 tablet (20 mg) by mouth once daily., Disp: 90 tablet, Rfl: 3     Last Recorded Vitals  /80 (BP Location: Left arm, Patient Position: Sitting, BP Cuff Size: Adult)   Pulse 68   Ht 1.6 m (5' 3\")   Wt 88 kg (194 lb)   SpO2 98%   BMI 34.37 kg/m²     Physical Exam:    Physical Exam  Vitals reviewed.   Constitutional:       Appearance: Normal appearance.   HENT:      Head: Normocephalic and atraumatic.      Mouth/Throat:      Mouth: Mucous membranes are " "moist.      Pharynx: Oropharynx is clear.   Cardiovascular:      Rate and Rhythm: Normal rate and regular rhythm.      Pulses: Normal pulses.      Heart sounds: Normal heart sounds.   Pulmonary:      Effort: Pulmonary effort is normal.      Breath sounds: Normal breath sounds.   Abdominal:      General: Bowel sounds are normal.      Palpations: Abdomen is soft.   Musculoskeletal:      Cervical back: Neck supple.   Skin:     General: Skin is warm and dry.   Neurological:      General: No focal deficit present.      Mental Status: She is alert.   Psychiatric:         Mood and Affect: Mood normal.         Behavior: Behavior normal.         Lab/Radiology/Diagnostic Review:    Labs    Lab Results   Component Value Date    GLUCOSE 109 (H) 12/01/2022    CALCIUM 9.5 12/01/2022     12/01/2022    K 4.1 12/01/2022    CO2 28 12/01/2022     12/01/2022    BUN 14 12/01/2022    CREATININE 0.87 12/01/2022       Lab Results   Component Value Date    WBC 5.8 12/01/2022    HGB 13.3 12/01/2022    HCT 40.9 12/01/2022    MCV 96 12/01/2022     (L) 12/01/2022       Lab Results   Component Value Date    CHOL 163 07/29/2023    CHOL 177 04/12/2023    CHOL 209 (H) 12/01/2022     Lab Results   Component Value Date    HDL 55.4 07/29/2023    HDL 58.4 04/12/2023    HDL 57.6 12/01/2022     No results found for: \"LDLCALC\"  Lab Results   Component Value Date    TRIG 141 07/29/2023    TRIG 125 04/12/2023    TRIG 139 12/01/2022     No components found for: \"CHOLHDL\"    Lab Results   Component Value Date    BNP 35 02/09/2022       Lab Results   Component Value Date    TSH 3.98 12/01/2022       Assessment:   1.  CAD status post stent implantation  2.  Stable angina  3.  Hypertension  4.  Hyperlipidemia  5.  Palpitations    Overall Plan:  Patient is somewhat more symptomatic than her previous visit.  Some of this may be related to decreasing isosorbide.    We will go ahead and start atenolol 12.5 mg daily.  She can increase this to 25 mg " daily if she feels more palpitations.     Patient will continue isosorbide 15 mg daily and lisinopril/hydrochlorothiazide.     Patient will stay on aspirin therapy. She will stay on statin therapy.     Patient will follow up with me in 6 months or sooner if she has more problems.     Disposition-     Thank you for your visit today. Please contact our office with any questions.     Javi Aburto MD

## 2023-11-03 NOTE — LETTER
November 3, 2023       No Recipients    Patient: Kennedi Machado   YOB: 1965   Date of Visit: 11/3/2023       Dear Dr. Sweeney Recipients:    Thank you for referring Kennedi Machado to me for evaluation. Below are my notes for this consultation.  If you have questions, please do not hesitate to call me. I look forward to following your patient along with you.       Sincerely,     Javi Aburto MD      CC:   No Recipients  ______________________________________________________________________________________        [facility] Cardiology Outpatient Follow-up Visit     Reason for Visit: CAD    HPI: Kennedi Machado is a 57 y.o.  female who presents today for followup.     The patient is a 57-year-old female with a history of coronary artery disease status post stent implantation who presents with chest discomfort. She admits to an occasional chest discomfort.  She admits to an occasional palpitation.  She denies any shortness of breath.  She denies any dyspnea on exertion. She denies any lightheadedness, syncope, orthopnea, PND, lower extremity edema.      She decreased her isosorbide to 15 mg daily.  She has been a little more symptomatic since that time.     5/21/2020 cardiac catheterization: Patent proximal LAD stent, 80% ostial OM1 stenosis suitable for medical therapy, mild RCA disease, normal left heart filling pressures. 10/3/2019 cardiac catheterization: ROSA of the proximal LAD. 9/8/2022 MPI: Normal perfusion, ejection fraction 83%. 9/8/2022 ECG: Normal sinus rhythm, RSR pattern. 10/10/2022 catheterization: Stable CAD since 2020. Patent LAD stent. Mild circumflex and RCA disease. Branch vessel disease of OM1 suitable for medical therapy based on vessel size and lesion location.  7/29/2023 , LDL 79, HDL 55, triglycerides 141.    Past Medical History:   She has a past medical history of Abnormal levels of other serum enzymes (12/01/2019), Acute maxillary sinusitis, unspecified (12/20/2019), Allergy,  unspecified, initial encounter (05/24/2020), Chondrocostal junction syndrome (tietze) (05/14/2018), Chronic sinusitis, unspecified (04/12/2018), Complication of other artery following a procedure, not elsewhere classified, initial encounter (10/24/2019), Contact with and (suspected) exposure to asbestos (05/14/2018), Encounter for follow-up examination after completed treatment for conditions other than malignant neoplasm (10/07/2019), Encounter for screening for malignant neoplasm of skin (12/28/2018), Low back pain, unspecified (03/15/2019), Muscle spasm of back (03/15/2019), Other conditions influencing health status (01/05/2021), Other injury of unspecified body region, initial encounter (10/07/2019), Other specified abnormal findings of blood chemistry (08/29/2019), Personal history of colonic polyps (06/04/2020), Personal history of diseases of the blood and blood-forming organs and certain disorders involving the immune mechanism (06/30/2020), Personal history of other benign neoplasm (12/28/2018), Personal history of other diseases of the circulatory system (10/24/2019), Personal history of other diseases of the musculoskeletal system and connective tissue (03/04/2019), Personal history of other diseases of the musculoskeletal system and connective tissue (03/15/2019), Personal history of other diseases of the respiratory system, Personal history of other diseases of the respiratory system (11/17/2021), Personal history of other diseases of the respiratory system (02/03/2021), Personal history of other diseases of the respiratory system (12/13/2017), Personal history of other diseases of urinary system, Personal history of other endocrine, nutritional and metabolic disease (01/15/2019), Personal history of other specified conditions (10/28/2019), Personal history of other specified conditions (01/09/2020), Personal history of other specified conditions (01/09/2020), Personal history of other specified  conditions (04/01/2020), Personal history of other specified conditions (09/19/2019), and Sleep related leg cramps (01/09/2020).    Surgical History:   She has a past surgical history that includes Cholecystectomy (05/14/2018); Hysterectomy (05/14/2018); Appendectomy (05/14/2018); Other surgical history (11/09/2022); Other surgical history (09/23/2022); Other surgical history (09/23/2022); Other surgical history (09/23/2022); Colonoscopy (10/21/2020); and [place] tooth extraction.    Family History:   Family History   Problem Relation Name Age of Onset   • Colon cancer Mother     • Asthma Mother     • Emphysema Father     • Other (dm2) Brother     • Other (waldonstrom macroglobulinemia) Brother         Allergies:  Bisoprolol-hydrochlorothiazide, Ranolazine, Sulfa (sulfonamide antibiotics), and Diclofenac     Social History:   No cigarettes, social alcohol, no drugs    Prior Cardiovascular Testing (Personally Reviewed):     Review of Systems:  Review of Systems   All other systems reviewed and are negative.      Outpatient Medications:    Current Outpatient Medications:   •  aspirin 81 mg EC tablet, Take 1 tablet (81 mg) by mouth once daily., Disp: , Rfl:   •  cholecalciferol (Vitamin D-3) 5,000 Units tablet, Take 1 tablet (125 mcg) by mouth once daily., Disp: , Rfl:   •  coenzyme Q-10 100 mg capsule, Take 1 capsule (100 mg) by mouth 2 times a day., Disp: , Rfl:   •  fluticasone (Flonase) 50 mcg/actuation nasal spray, Administer 2 sprays into each nostril once daily., Disp: , Rfl:   •  isosorbide mononitrate ER (Imdur) 30 mg 24 hr tablet, Take 1 tablet (30 mg) by mouth once daily., Disp: , Rfl:   •  levothyroxine (Synthroid, Levoxyl) 50 mcg tablet, TAKE 1 TABLET BY MOUTH ONCE DAILY., Disp: 90 tablet, Rfl: 0  •  lisinopriL-hydrochlorothiazide 20-25 mg tablet, Take 1 tablet by mouth once daily., Disp: 90 tablet, Rfl: 0  •  nitroglycerin (Nitrostat) 0.4 mg SL tablet, Place 1 tablet (0.4 mg) under the tongue every 5  "minutes if needed., Disp: , Rfl:   •  rosuvastatin (Crestor) 20 mg tablet, Take 1 tablet (20 mg) by mouth once daily., Disp: 90 tablet, Rfl: 3     Last Recorded Vitals  /80 (BP Location: Left arm, Patient Position: Sitting, BP Cuff Size: Adult)   Pulse 68   Ht 1.6 m (5' 3\")   Wt 88 kg (194 lb)   SpO2 98%   BMI 34.37 kg/m²     Physical Exam:    Physical Exam  Vitals reviewed.   Constitutional:       Appearance: Normal appearance.   HENT:      Head: Normocephalic and atraumatic.      Mouth/Throat:      Mouth: Mucous membranes are moist.      Pharynx: Oropharynx is clear.   Cardiovascular:      Rate and Rhythm: Normal rate and regular rhythm.      Pulses: Normal pulses.      Heart sounds: Normal heart sounds.   Pulmonary:      Effort: Pulmonary effort is normal.      Breath sounds: Normal breath sounds.   Abdominal:      General: Bowel sounds are normal.      Palpations: Abdomen is soft.   Musculoskeletal:      Cervical back: Neck supple.   Skin:     General: Skin is warm and dry.   Neurological:      General: No focal deficit present.      Mental Status: She is alert.   Psychiatric:         Mood and Affect: Mood normal.         Behavior: Behavior normal.         Lab/Radiology/Diagnostic Review:    Labs    Lab Results   Component Value Date    GLUCOSE 109 (H) 12/01/2022    CALCIUM 9.5 12/01/2022     12/01/2022    K 4.1 12/01/2022    CO2 28 12/01/2022     12/01/2022    BUN 14 12/01/2022    CREATININE 0.87 12/01/2022       Lab Results   Component Value Date    WBC 5.8 12/01/2022    HGB 13.3 12/01/2022    HCT 40.9 12/01/2022    MCV 96 12/01/2022     (L) 12/01/2022       Lab Results   Component Value Date    CHOL 163 07/29/2023    CHOL 177 04/12/2023    CHOL 209 (H) 12/01/2022     Lab Results   Component Value Date    HDL 55.4 07/29/2023    HDL 58.4 04/12/2023    HDL 57.6 12/01/2022     No results found for: \"LDLCALC\"  Lab Results   Component Value Date    TRIG 141 07/29/2023    TRIG 125 " "04/12/2023    TRIG 139 12/01/2022     No components found for: \"CHOLHDL\"    Lab Results   Component Value Date    BNP 35 02/09/2022       Lab Results   Component Value Date    TSH 3.98 12/01/2022       Assessment:   1.  CAD status post stent implantation  2.  Stable angina  3.  Hypertension  4.  Hyperlipidemia  5.  Palpitations    Overall Plan:  Patient is somewhat more symptomatic than her previous visit.  Some of this may be related to decreasing isosorbide.    We will go ahead and start atenolol 12.5 mg daily.  She can increase this to 25 mg daily if she feels more palpitations.     Patient will continue isosorbide 15 mg daily and lisinopril/hydrochlorothiazide.     Patient will stay on aspirin therapy. She will stay on statin therapy.     Patient will follow up with me in 6 months or sooner if she has more problems.     Disposition-     Thank you for your visit today. Please contact our office with any questions.     Javi Aburto MD    "

## 2023-11-03 NOTE — LETTER
November 3, 2023     Dea Sauceda MD  [address]  [facility], [address]  [facility] [address]    Patient: Kennedi Machado   YOB: 1965   Date of Visit: 11/3/2023       Dear Dr. Dea Sauceda MD:    Thank you for referring Kennedi Machado to me for evaluation. Below are my notes for this consultation.  If you have questions, please do not hesitate to call me. I look forward to following your patient along with you.       Sincerely,     Javi Aburto MD      CC: No Recipients  ______________________________________________________________________________________        [facility] Cardiology Outpatient Follow-up Visit     Reason for Visit: CAD    HPI: Kennedi Machado is a 57 y.o.  female who presents today for followup.     The patient is a 57-year-old female with a history of coronary artery disease status post stent implantation who presents with chest discomfort. She admits to an occasional chest discomfort.  She admits to an occasional palpitation.  She denies any shortness of breath.  She denies any dyspnea on exertion. She denies any lightheadedness, syncope, orthopnea, PND, lower extremity edema.      She decreased her isosorbide to 15 mg daily.  She has been a little more symptomatic since that time.     5/21/2020 cardiac catheterization: Patent proximal LAD stent, 80% ostial OM1 stenosis suitable for medical therapy, mild RCA disease, normal left heart filling pressures. 10/3/2019 cardiac catheterization: ROSA of the proximal LAD. 9/8/2022 MPI: Normal perfusion, ejection fraction 83%. 9/8/2022 ECG: Normal sinus rhythm, RSR pattern. 10/10/2022 catheterization: Stable CAD since 2020. Patent LAD stent. Mild circumflex and RCA disease. Branch vessel disease of OM1 suitable for medical therapy based on vessel size and lesion location.  7/29/2023 , LDL 79, HDL 55, triglycerides 141.    Past Medical History:   She has a past medical history of Abnormal levels of other serum enzymes  (12/01/2019), Acute maxillary sinusitis, unspecified (12/20/2019), Allergy, unspecified, initial encounter (05/24/2020), Chondrocostal junction syndrome (tietze) (05/14/2018), Chronic sinusitis, unspecified (04/12/2018), Complication of other artery following a procedure, not elsewhere classified, initial encounter (10/24/2019), Contact with and (suspected) exposure to asbestos (05/14/2018), Encounter for follow-up examination after completed treatment for conditions other than malignant neoplasm (10/07/2019), Encounter for screening for malignant neoplasm of skin (12/28/2018), Low back pain, unspecified (03/15/2019), Muscle spasm of back (03/15/2019), Other conditions influencing health status (01/05/2021), Other injury of unspecified body region, initial encounter (10/07/2019), Other specified abnormal findings of blood chemistry (08/29/2019), Personal history of colonic polyps (06/04/2020), Personal history of diseases of the blood and blood-forming organs and certain disorders involving the immune mechanism (06/30/2020), Personal history of other benign neoplasm (12/28/2018), Personal history of other diseases of the circulatory system (10/24/2019), Personal history of other diseases of the musculoskeletal system and connective tissue (03/04/2019), Personal history of other diseases of the musculoskeletal system and connective tissue (03/15/2019), Personal history of other diseases of the respiratory system, Personal history of other diseases of the respiratory system (11/17/2021), Personal history of other diseases of the respiratory system (02/03/2021), Personal history of other diseases of the respiratory system (12/13/2017), Personal history of other diseases of urinary system, Personal history of other endocrine, nutritional and metabolic disease (01/15/2019), Personal history of other specified conditions (10/28/2019), Personal history of other specified conditions (01/09/2020), Personal history of other  specified conditions (01/09/2020), Personal history of other specified conditions (04/01/2020), Personal history of other specified conditions (09/19/2019), and Sleep related leg cramps (01/09/2020).    Surgical History:   She has a past surgical history that includes Cholecystectomy (05/14/2018); Hysterectomy (05/14/2018); Appendectomy (05/14/2018); Other surgical history (11/09/2022); Other surgical history (09/23/2022); Other surgical history (09/23/2022); Other surgical history (09/23/2022); Colonoscopy (10/21/2020); and [place] tooth extraction.    Family History:   Family History   Problem Relation Name Age of Onset   • Colon cancer Mother     • Asthma Mother     • Emphysema Father     • Other (dm2) Brother     • Other (waldonstrom macroglobulinemia) Brother         Allergies:  Bisoprolol-hydrochlorothiazide, Ranolazine, Sulfa (sulfonamide antibiotics), and Diclofenac     Social History:   No cigarettes, social alcohol, no drugs    Prior Cardiovascular Testing (Personally Reviewed):     Review of Systems:  Review of Systems   All other systems reviewed and are negative.      Outpatient Medications:    Current Outpatient Medications:   •  aspirin 81 mg EC tablet, Take 1 tablet (81 mg) by mouth once daily., Disp: , Rfl:   •  cholecalciferol (Vitamin D-3) 5,000 Units tablet, Take 1 tablet (125 mcg) by mouth once daily., Disp: , Rfl:   •  coenzyme Q-10 100 mg capsule, Take 1 capsule (100 mg) by mouth 2 times a day., Disp: , Rfl:   •  fluticasone (Flonase) 50 mcg/actuation nasal spray, Administer 2 sprays into each nostril once daily., Disp: , Rfl:   •  isosorbide mononitrate ER (Imdur) 30 mg 24 hr tablet, Take 1 tablet (30 mg) by mouth once daily., Disp: , Rfl:   •  levothyroxine (Synthroid, Levoxyl) 50 mcg tablet, TAKE 1 TABLET BY MOUTH ONCE DAILY., Disp: 90 tablet, Rfl: 0  •  lisinopriL-hydrochlorothiazide 20-25 mg tablet, Take 1 tablet by mouth once daily., Disp: 90 tablet, Rfl: 0  •  nitroglycerin (Nitrostat)  "0.4 mg SL tablet, Place 1 tablet (0.4 mg) under the tongue every 5 minutes if needed., Disp: , Rfl:   •  rosuvastatin (Crestor) 20 mg tablet, Take 1 tablet (20 mg) by mouth once daily., Disp: 90 tablet, Rfl: 3     Last Recorded Vitals  /80 (BP Location: Left arm, Patient Position: Sitting, BP Cuff Size: Adult)   Pulse 68   Ht 1.6 m (5' 3\")   Wt 88 kg (194 lb)   SpO2 98%   BMI 34.37 kg/m²     Physical Exam:    Physical Exam  Vitals reviewed.   Constitutional:       Appearance: Normal appearance.   HENT:      Head: Normocephalic and atraumatic.      Mouth/Throat:      Mouth: Mucous membranes are moist.      Pharynx: Oropharynx is clear.   Cardiovascular:      Rate and Rhythm: Normal rate and regular rhythm.      Pulses: Normal pulses.      Heart sounds: Normal heart sounds.   Pulmonary:      Effort: Pulmonary effort is normal.      Breath sounds: Normal breath sounds.   Abdominal:      General: Bowel sounds are normal.      Palpations: Abdomen is soft.   Musculoskeletal:      Cervical back: Neck supple.   Skin:     General: Skin is warm and dry.   Neurological:      General: No focal deficit present.      Mental Status: She is alert.   Psychiatric:         Mood and Affect: Mood normal.         Behavior: Behavior normal.         Lab/Radiology/Diagnostic Review:    Labs    Lab Results   Component Value Date    GLUCOSE 109 (H) 12/01/2022    CALCIUM 9.5 12/01/2022     12/01/2022    K 4.1 12/01/2022    CO2 28 12/01/2022     12/01/2022    BUN 14 12/01/2022    CREATININE 0.87 12/01/2022       Lab Results   Component Value Date    WBC 5.8 12/01/2022    HGB 13.3 12/01/2022    HCT 40.9 12/01/2022    MCV 96 12/01/2022     (L) 12/01/2022       Lab Results   Component Value Date    CHOL 163 07/29/2023    CHOL 177 04/12/2023    CHOL 209 (H) 12/01/2022     Lab Results   Component Value Date    HDL 55.4 07/29/2023    HDL 58.4 04/12/2023    HDL 57.6 12/01/2022     No results found for: \"LDLCALC\"  Lab " "Results   Component Value Date    TRIG 141 07/29/2023    TRIG 125 04/12/2023    TRIG 139 12/01/2022     No components found for: \"CHOLHDL\"    Lab Results   Component Value Date    BNP 35 02/09/2022       Lab Results   Component Value Date    TSH 3.98 12/01/2022       Assessment:   1.  CAD status post stent implantation  2.  Stable angina  3.  Hypertension  4.  Hyperlipidemia  5.  Palpitations    Overall Plan:  Patient is somewhat more symptomatic than her previous visit.  Some of this may be related to decreasing isosorbide.    We will go ahead and start atenolol 12.5 mg daily.  She can increase this to 25 mg daily if she feels more palpitations.     Patient will continue isosorbide 15 mg daily and lisinopril/hydrochlorothiazide.     Patient will stay on aspirin therapy. She will stay on statin therapy.     Patient will follow up with me in 6 months or sooner if she has more problems.     Disposition-     Thank you for your visit today. Please contact our office with any questions.     Javi Aburto MD      "

## 2023-12-07 ENCOUNTER — APPOINTMENT (OUTPATIENT)
Dept: PRIMARY CARE | Facility: CLINIC | Age: 58
End: 2023-12-07
Payer: COMMERCIAL

## 2023-12-08 VITALS
HEART RATE: 56 BPM | RESPIRATION RATE: 16 BRPM | HEART RATE: 59 BPM | SYSTOLIC BLOOD PRESSURE: 135 MMHG | RESPIRATION RATE: 16 BRPM | SYSTOLIC BLOOD PRESSURE: 87 MMHG | SYSTOLIC BLOOD PRESSURE: 87 MMHG | DIASTOLIC BLOOD PRESSURE: 34 MMHG | HEART RATE: 56 BPM | SYSTOLIC BLOOD PRESSURE: 144 MMHG | SYSTOLIC BLOOD PRESSURE: 98 MMHG | OXYGEN SATURATION: 95 % | SYSTOLIC BLOOD PRESSURE: 129 MMHG | HEART RATE: 68 BPM | HEIGHT: 62 IN | OXYGEN SATURATION: 96 % | RESPIRATION RATE: 13 BRPM | DIASTOLIC BLOOD PRESSURE: 34 MMHG | TEMPERATURE: 97.3 F | DIASTOLIC BLOOD PRESSURE: 54 MMHG | SYSTOLIC BLOOD PRESSURE: 123 MMHG | HEART RATE: 58 BPM | RESPIRATION RATE: 12 BRPM | OXYGEN SATURATION: 95 % | SYSTOLIC BLOOD PRESSURE: 97 MMHG | HEART RATE: 59 BPM | SYSTOLIC BLOOD PRESSURE: 91 MMHG | DIASTOLIC BLOOD PRESSURE: 61 MMHG | HEART RATE: 62 BPM | HEART RATE: 77 BPM | SYSTOLIC BLOOD PRESSURE: 124 MMHG | HEART RATE: 58 BPM | DIASTOLIC BLOOD PRESSURE: 32 MMHG | SYSTOLIC BLOOD PRESSURE: 98 MMHG | DIASTOLIC BLOOD PRESSURE: 43 MMHG | HEART RATE: 52 BPM | SYSTOLIC BLOOD PRESSURE: 129 MMHG | SYSTOLIC BLOOD PRESSURE: 144 MMHG | DIASTOLIC BLOOD PRESSURE: 80 MMHG | SYSTOLIC BLOOD PRESSURE: 124 MMHG | WEIGHT: 188 LBS | RESPIRATION RATE: 18 BRPM | HEART RATE: 60 BPM | HEART RATE: 62 BPM | RESPIRATION RATE: 12 BRPM | HEART RATE: 62 BPM | HEART RATE: 56 BPM | SYSTOLIC BLOOD PRESSURE: 135 MMHG | SYSTOLIC BLOOD PRESSURE: 135 MMHG | DIASTOLIC BLOOD PRESSURE: 80 MMHG | DIASTOLIC BLOOD PRESSURE: 61 MMHG | DIASTOLIC BLOOD PRESSURE: 51 MMHG | DIASTOLIC BLOOD PRESSURE: 33 MMHG | RESPIRATION RATE: 10 BRPM | DIASTOLIC BLOOD PRESSURE: 47 MMHG | HEART RATE: 64 BPM | RESPIRATION RATE: 12 BRPM | RESPIRATION RATE: 10 BRPM | OXYGEN SATURATION: 96 % | SYSTOLIC BLOOD PRESSURE: 97 MMHG | RESPIRATION RATE: 13 BRPM | DIASTOLIC BLOOD PRESSURE: 61 MMHG | SYSTOLIC BLOOD PRESSURE: 87 MMHG | HEIGHT: 62 IN | HEART RATE: 52 BPM | DIASTOLIC BLOOD PRESSURE: 80 MMHG | SYSTOLIC BLOOD PRESSURE: 144 MMHG | WEIGHT: 188 LBS | HEART RATE: 57 BPM | RESPIRATION RATE: 16 BRPM | SYSTOLIC BLOOD PRESSURE: 125 MMHG | DIASTOLIC BLOOD PRESSURE: 33 MMHG | RESPIRATION RATE: 18 BRPM | HEART RATE: 64 BPM | SYSTOLIC BLOOD PRESSURE: 91 MMHG | RESPIRATION RATE: 11 BRPM | DIASTOLIC BLOOD PRESSURE: 37 MMHG | DIASTOLIC BLOOD PRESSURE: 32 MMHG | HEART RATE: 68 BPM | OXYGEN SATURATION: 98 % | HEART RATE: 58 BPM | DIASTOLIC BLOOD PRESSURE: 37 MMHG | OXYGEN SATURATION: 97 % | RESPIRATION RATE: 10 BRPM | DIASTOLIC BLOOD PRESSURE: 51 MMHG | SYSTOLIC BLOOD PRESSURE: 124 MMHG | HEART RATE: 57 BPM | HEART RATE: 77 BPM | DIASTOLIC BLOOD PRESSURE: 47 MMHG | HEART RATE: 59 BPM | SYSTOLIC BLOOD PRESSURE: 97 MMHG | DIASTOLIC BLOOD PRESSURE: 33 MMHG | RESPIRATION RATE: 13 BRPM | RESPIRATION RATE: 18 BRPM | DIASTOLIC BLOOD PRESSURE: 37 MMHG | DIASTOLIC BLOOD PRESSURE: 51 MMHG | HEART RATE: 60 BPM | DIASTOLIC BLOOD PRESSURE: 34 MMHG | TEMPERATURE: 97.3 F | SYSTOLIC BLOOD PRESSURE: 91 MMHG | HEART RATE: 60 BPM | HEART RATE: 52 BPM | SYSTOLIC BLOOD PRESSURE: 98 MMHG | OXYGEN SATURATION: 95 % | HEART RATE: 77 BPM | OXYGEN SATURATION: 96 % | OXYGEN SATURATION: 97 % | DIASTOLIC BLOOD PRESSURE: 32 MMHG | DIASTOLIC BLOOD PRESSURE: 43 MMHG | HEIGHT: 62 IN | SYSTOLIC BLOOD PRESSURE: 129 MMHG | RESPIRATION RATE: 11 BRPM | TEMPERATURE: 97.3 F | OXYGEN SATURATION: 98 % | DIASTOLIC BLOOD PRESSURE: 54 MMHG | SYSTOLIC BLOOD PRESSURE: 125 MMHG | DIASTOLIC BLOOD PRESSURE: 47 MMHG | HEART RATE: 64 BPM | HEART RATE: 68 BPM | WEIGHT: 188 LBS | SYSTOLIC BLOOD PRESSURE: 123 MMHG | RESPIRATION RATE: 11 BRPM | DIASTOLIC BLOOD PRESSURE: 43 MMHG | OXYGEN SATURATION: 98 % | SYSTOLIC BLOOD PRESSURE: 125 MMHG | OXYGEN SATURATION: 97 % | HEART RATE: 57 BPM | SYSTOLIC BLOOD PRESSURE: 123 MMHG | DIASTOLIC BLOOD PRESSURE: 54 MMHG

## 2023-12-09 ENCOUNTER — LAB (OUTPATIENT)
Dept: LAB | Facility: LAB | Age: 58
End: 2023-12-09
Payer: COMMERCIAL

## 2023-12-09 DIAGNOSIS — F32.9 CURRENT EPISODE OF MAJOR DEPRESSIVE DISORDER WITHOUT PRIOR EPISODE, UNSPECIFIED DEPRESSION EPISODE SEVERITY: ICD-10-CM

## 2023-12-09 DIAGNOSIS — Z00.00 ANNUAL PHYSICAL EXAM: ICD-10-CM

## 2023-12-09 DIAGNOSIS — E78.2 MIXED HYPERLIPIDEMIA: ICD-10-CM

## 2023-12-09 DIAGNOSIS — I25.118 CORONARY ARTERY DISEASE OF NATIVE ARTERY OF NATIVE HEART WITH STABLE ANGINA PECTORIS (CMS-HCC): ICD-10-CM

## 2023-12-09 DIAGNOSIS — I10 PRIMARY HYPERTENSION: ICD-10-CM

## 2023-12-09 DIAGNOSIS — E03.9 HYPOTHYROIDISM, UNSPECIFIED TYPE: ICD-10-CM

## 2023-12-09 DIAGNOSIS — M1A.9XX0 CHRONIC GOUT WITHOUT TOPHUS, UNSPECIFIED CAUSE, UNSPECIFIED SITE: ICD-10-CM

## 2023-12-09 LAB
ERYTHROCYTE [DISTWIDTH] IN BLOOD BY AUTOMATED COUNT: 13 % (ref 11.5–14.5)
HCT VFR BLD AUTO: 37.9 % (ref 36–46)
HGB BLD-MCNC: 12.3 G/DL (ref 12–16)
MCH RBC QN AUTO: 30.1 PG (ref 26–34)
MCHC RBC AUTO-ENTMCNC: 32.5 G/DL (ref 32–36)
MCV RBC AUTO: 93 FL (ref 80–100)
NRBC BLD-RTO: 0 /100 WBCS (ref 0–0)
PLATELET # BLD AUTO: 87 X10*3/UL (ref 150–450)
RBC # BLD AUTO: 4.08 X10*6/UL (ref 4–5.2)
WBC # BLD AUTO: 4.7 X10*3/UL (ref 4.4–11.3)

## 2023-12-09 PROCEDURE — 84443 ASSAY THYROID STIM HORMONE: CPT

## 2023-12-09 PROCEDURE — 80061 LIPID PANEL: CPT

## 2023-12-09 PROCEDURE — 80053 COMPREHEN METABOLIC PANEL: CPT

## 2023-12-09 PROCEDURE — 82306 VITAMIN D 25 HYDROXY: CPT

## 2023-12-09 PROCEDURE — 85027 COMPLETE CBC AUTOMATED: CPT

## 2023-12-09 PROCEDURE — 36415 COLL VENOUS BLD VENIPUNCTURE: CPT

## 2023-12-10 LAB
25(OH)D3 SERPL-MCNC: 67 NG/ML (ref 30–100)
ALBUMIN SERPL BCP-MCNC: 4 G/DL (ref 3.4–5)
ALP SERPL-CCNC: 64 U/L (ref 33–136)
ALT SERPL W P-5'-P-CCNC: 46 U/L (ref 7–45)
ANION GAP SERPL CALC-SCNC: 12 MMOL/L (ref 10–20)
AST SERPL W P-5'-P-CCNC: 26 U/L (ref 9–39)
BILIRUB SERPL-MCNC: 0.5 MG/DL (ref 0–1.2)
BUN SERPL-MCNC: 14 MG/DL (ref 6–23)
CALCIUM SERPL-MCNC: 9.1 MG/DL (ref 8.6–10.6)
CHLORIDE SERPL-SCNC: 104 MMOL/L (ref 98–107)
CHOLEST SERPL-MCNC: 161 MG/DL (ref 0–199)
CHOLESTEROL/HDL RATIO: 3.1
CO2 SERPL-SCNC: 30 MMOL/L (ref 21–32)
CREAT SERPL-MCNC: 0.92 MG/DL (ref 0.5–1.05)
GFR SERPL CREATININE-BSD FRML MDRD: 71 ML/MIN/1.73M*2
GLUCOSE SERPL-MCNC: 118 MG/DL (ref 74–99)
HDLC SERPL-MCNC: 52.5 MG/DL
LDLC SERPL CALC-MCNC: 84 MG/DL
NON HDL CHOLESTEROL: 109 MG/DL (ref 0–149)
POTASSIUM SERPL-SCNC: 3.9 MMOL/L (ref 3.5–5.3)
PROT SERPL-MCNC: 6.7 G/DL (ref 6.4–8.2)
SODIUM SERPL-SCNC: 142 MMOL/L (ref 136–145)
TRIGL SERPL-MCNC: 123 MG/DL (ref 0–149)
TSH SERPL-ACNC: 1.87 MIU/L (ref 0.44–3.98)
VLDL: 25 MG/DL (ref 0–40)

## 2023-12-14 ENCOUNTER — OFFICE VISIT (OUTPATIENT)
Dept: PRIMARY CARE | Facility: CLINIC | Age: 58
End: 2023-12-14
Payer: COMMERCIAL

## 2023-12-14 VITALS
DIASTOLIC BLOOD PRESSURE: 74 MMHG | HEART RATE: 60 BPM | HEIGHT: 63 IN | SYSTOLIC BLOOD PRESSURE: 132 MMHG | WEIGHT: 194.4 LBS | BODY MASS INDEX: 34.45 KG/M2

## 2023-12-14 DIAGNOSIS — I25.118 CORONARY ARTERY DISEASE OF NATIVE ARTERY OF NATIVE HEART WITH STABLE ANGINA PECTORIS (CMS-HCC): Primary | ICD-10-CM

## 2023-12-14 DIAGNOSIS — I10 PRIMARY HYPERTENSION: ICD-10-CM

## 2023-12-14 DIAGNOSIS — E78.5 HYPERLIPIDEMIA, UNSPECIFIED HYPERLIPIDEMIA TYPE: ICD-10-CM

## 2023-12-14 PROCEDURE — 99214 OFFICE O/P EST MOD 30 MIN: CPT | Performed by: INTERNAL MEDICINE

## 2023-12-14 PROCEDURE — 1036F TOBACCO NON-USER: CPT | Performed by: INTERNAL MEDICINE

## 2023-12-14 PROCEDURE — 3078F DIAST BP <80 MM HG: CPT | Performed by: INTERNAL MEDICINE

## 2023-12-14 PROCEDURE — 3075F SYST BP GE 130 - 139MM HG: CPT | Performed by: INTERNAL MEDICINE

## 2023-12-14 RX ORDER — PROMETHAZINE HYDROCHLORIDE 25 MG/1
TABLET ORAL
COMMUNITY
Start: 2023-12-04 | End: 2024-04-08 | Stop reason: ALTCHOICE

## 2023-12-14 RX ORDER — AMOXICILLIN 500 MG/1
CAPSULE ORAL
COMMUNITY
Start: 2023-12-07 | End: 2024-04-08 | Stop reason: ALTCHOICE

## 2023-12-14 RX ORDER — POLYETHYLENE GLYCOL-3350 AND ELECTROLYTES 236; 6.74; 5.86; 2.97; 22.74 G/274.31G; G/274.31G; G/274.31G; G/274.31G; G/274.31G
POWDER, FOR SOLUTION ORAL
COMMUNITY
Start: 2023-12-04 | End: 2024-04-08 | Stop reason: ALTCHOICE

## 2023-12-14 ASSESSMENT — PAIN SCALES - GENERAL: PAINLEVEL: 0-NO PAIN

## 2023-12-14 ASSESSMENT — ENCOUNTER SYMPTOMS
COUGH: 0
DYSURIA: 0
FEVER: 0
LIGHT-HEADEDNESS: 0
DIFFICULTY URINATING: 0
NAUSEA: 0
FREQUENCY: 0
ARTHRALGIAS: 0
SORE THROAT: 0
MYALGIAS: 0
FATIGUE: 0
PALPITATIONS: 0
HEMATURIA: 0
DIARRHEA: 0
HEADACHES: 0
VOMITING: 0
CHILLS: 0
SHORTNESS OF BREATH: 0
CONSTIPATION: 0
DIZZINESS: 0
WEAKNESS: 0

## 2023-12-14 ASSESSMENT — PATIENT HEALTH QUESTIONNAIRE - PHQ9
SUM OF ALL RESPONSES TO PHQ9 QUESTIONS 1 AND 2: 0
2. FEELING DOWN, DEPRESSED OR HOPELESS: NOT AT ALL
1. LITTLE INTEREST OR PLEASURE IN DOING THINGS: NOT AT ALL

## 2023-12-14 NOTE — ASSESSMENT & PLAN NOTE
Blood pressure is very mildly elevated today 132/74 so patient was asked to check her blood pressure more frequently at home.  Regular follow-up with me will be in 4 months

## 2023-12-14 NOTE — PROGRESS NOTES
Subjective   Patient ID: Kennedi Machado is a 58 y.o. female who presents for No chief complaint on file..  Patient is here today for routine 4-month follow-up for coronary artery disease, high cholesterol, hypertension, and hypothyroid as well as medication management.  Patient just finished annual blood work earlier this month and we reviewed all of it with her in detail.  Most significant is a glucose of 118 so we did talk about prediabetes and a low carbohydrate, low sugar diet.        Review of Systems   Constitutional:  Negative for chills, fatigue and fever.   HENT:  Negative for sore throat.    Eyes:  Negative for visual disturbance.   Respiratory:  Negative for cough and shortness of breath.    Cardiovascular:  Negative for chest pain, palpitations and leg swelling.   Gastrointestinal:  Negative for constipation, diarrhea, nausea and vomiting.   Genitourinary:  Negative for difficulty urinating, dysuria, frequency, hematuria and urgency.   Musculoskeletal:  Negative for arthralgias and myalgias.   Skin:  Negative for rash.   Neurological:  Negative for dizziness, syncope, weakness, light-headedness and headaches.       Objective   Medication Documentation Review Audit       Reviewed by Dea Sauceda MD (Physician) on 12/14/23 at 1410      Medication Order Taking? Sig Documenting Provider Last Dose Status   amoxicillin (Amoxil) 500 mg capsule [id number] Yes TAKE 4 CAPSULES 1 HOUR PRIOR TO APPOINMENT Historical Provider, MD Taking Active   aspirin 81 mg EC tablet [id number] Yes Take 1 tablet (81 mg) by mouth once daily. Historical Provider, MD Taking Active   atenolol (Tenormin) 25 mg tablet [id number] Yes Take 0.5 tablets (12.5 mg) by mouth once daily. Javi Aburto MD Taking Active   cholecalciferol (Vitamin D-3) 5,000 Units tablet [id number] Yes Take 1 tablet (125 mcg) by mouth once daily. Historical Provider, MD Taking Active   coenzyme Q-10 100 mg capsule [id number] Yes Take 1 capsule (100 mg) by mouth 2  times a day. Aristides Provider, MD Taking Active   fluticasone (Flonase) 50 mcg/actuation nasal spray [id number] Yes Administer 2 sprays into each nostril once daily. Aristides Provider, MD Taking Active   GaviLyte-G 236-22.74-6.74 -5.86 gram solution [id number] Yes TAKE AS DIRECTED Aristides Ly MD Taking Active   isosorbide mononitrate ER (Imdur) 30 mg 24 hr tablet [id number] Yes Take 0.5 tablets (15 mg) by mouth once daily. Per patient. Aristides Ly MD Taking Differently Active   levothyroxine (Synthroid, Levoxyl) 50 mcg tablet [id number] Yes TAKE 1 TABLET BY MOUTH ONCE DAILY. Dea Sauceda MD Taking Active   lisinopriL-hydrochlorothiazide 20-25 mg tablet [id number] Yes Take 1 tablet by mouth once daily. Dea Sauceda MD Taking Active   nitroglycerin (Nitrostat) 0.4 mg SL tablet [id number] Yes Place 1 tablet (0.4 mg) under the tongue every 5 minutes if needed. Aristides Ly MD Taking Active   promethazine (Phenergan) 25 mg tablet [id number] Yes TAKE 1 BY MOUTH EVERY 4 HOURS AS NEEDED FOR NAUSEA AND VOMITING Historical MD Malachi Taking Active   rosuvastatin (Crestor) 20 mg tablet [id number]  Take 1 tablet (20 mg) by mouth once daily. Dea Sauceda MD   10/14/23 [id number]                  Allergies   Allergen Reactions    Bisoprolol-Hydrochlorothiazide Hives and Itching     hyper    Ranolazine Hives    Sulfa (Sulfonamide Antibiotics) Hives    Diclofenac Rash     Patient takes aspirin 81 mg daily with no issues.     Physical Exam  Constitutional:       Appearance: Normal appearance.   HENT:      Head: Normocephalic and atraumatic.      Nose: Nose normal.   Eyes:      Extraocular Movements: Extraocular movements intact.      Pupils: Pupils are equal, round, and reactive to light.   Cardiovascular:      Rate and Rhythm: Normal rate and regular rhythm.   Pulmonary:      Breath sounds: Normal breath sounds.   Abdominal:      General: Abdomen is flat. Bowel sounds are normal.      Palpations:  "Abdomen is soft.   Musculoskeletal:      Right lower leg: No edema.      Left lower leg: No edema.   Neurological:      Mental Status: She is alert.     /74   Pulse 60   Ht 1.6 m (5' 3\")   Wt 88.2 kg (194 lb 6.4 oz)   BMI 34.44 kg/m²       Assessment/Plan   Problem List Items Addressed This Visit       Coronary artery disease of native heart with stable angina pectoris (CMS/HCC) - Primary    Hyperlipidemia    Hypertension     Blood pressure is very mildly elevated today 132/74 so patient was asked to check her blood pressure more frequently at home.  Regular follow-up with me will be in 4 months                   It has been a pleasure seeing you.  Dea Sauceda MD  "

## 2023-12-15 ENCOUNTER — AMBULATORY SURGICAL CENTER (OUTPATIENT)
Dept: URBAN - METROPOLITAN AREA SURGERY 12 | Facility: SURGERY | Age: 58
End: 2023-12-15
Payer: COMMERCIAL

## 2023-12-15 DIAGNOSIS — K57.30 DIVERTICULOSIS OF LARGE INTESTINE WITHOUT PERFORATION OR ABS: ICD-10-CM

## 2023-12-15 DIAGNOSIS — Z09 ENCOUNTER FOR FOLLOW-UP EXAMINATION AFTER COMPLETED TREATMEN: ICD-10-CM

## 2023-12-15 DIAGNOSIS — Z80.0 FAMILY HISTORY OF MALIGNANT NEOPLASM OF DIGESTIVE ORGANS: ICD-10-CM

## 2023-12-15 DIAGNOSIS — Z86.010 PERSONAL HISTORY OF COLONIC POLYPS: ICD-10-CM

## 2023-12-15 DIAGNOSIS — K64.8 OTHER HEMORRHOIDS: ICD-10-CM

## 2023-12-15 PROCEDURE — 45378 DIAGNOSTIC COLONOSCOPY: CPT | Performed by: INTERNAL MEDICINE

## 2024-02-19 ENCOUNTER — OFFICE VISIT (OUTPATIENT)
Dept: DERMATOLOGY | Facility: CLINIC | Age: 59
End: 2024-02-19
Payer: COMMERCIAL

## 2024-02-19 DIAGNOSIS — L82.1 SEBORRHEIC KERATOSIS: ICD-10-CM

## 2024-02-19 DIAGNOSIS — L81.4 LENTIGO: ICD-10-CM

## 2024-02-19 DIAGNOSIS — D22.9 NEVUS: ICD-10-CM

## 2024-02-19 DIAGNOSIS — R20.2 NOTALGIA PARESTHETICA: ICD-10-CM

## 2024-02-19 DIAGNOSIS — Z12.83 SKIN CANCER SCREENING: Primary | ICD-10-CM

## 2024-02-19 PROCEDURE — 99213 OFFICE O/P EST LOW 20 MIN: CPT | Performed by: NURSE PRACTITIONER

## 2024-02-19 PROCEDURE — 1036F TOBACCO NON-USER: CPT | Performed by: NURSE PRACTITIONER

## 2024-02-19 NOTE — PROGRESS NOTES
Subjective     Kennedi Machado is a 58 y.o. female who presents for the following: Skin Check.     Established patient in for yearly skin exam.     Review of Systems:  No other skin or systemic complaints other than what is documented elsewhere in the note.    The following portions of the chart were reviewed this encounter and updated as appropriate:       Skin Cancer History  No skin cancer on file.    Specialty Problems    None    Past Medical History:  Kennedi Machado  has a past medical history of Abnormal levels of other serum enzymes (12/01/2019), Acute maxillary sinusitis, unspecified (12/20/2019), Allergy, unspecified, initial encounter (05/24/2020), Chondrocostal junction syndrome (tietze) (05/14/2018), Chronic sinusitis, unspecified (04/12/2018), Complication of other artery following a procedure, not elsewhere classified, initial encounter (10/24/2019), Contact with and (suspected) exposure to asbestos (05/14/2018), Encounter for follow-up examination after completed treatment for conditions other than malignant neoplasm (10/07/2019), Encounter for screening for malignant neoplasm of skin (12/28/2018), Low back pain, unspecified (03/15/2019), Muscle spasm of back (03/15/2019), Other conditions influencing health status (01/05/2021), Other injury of unspecified body region, initial encounter (10/07/2019), Other specified abnormal findings of blood chemistry (08/29/2019), Personal history of colonic polyps (06/04/2020), Personal history of diseases of the blood and blood-forming organs and certain disorders involving the immune mechanism (06/30/2020), Personal history of other benign neoplasm (12/28/2018), Personal history of other diseases of the circulatory system (10/24/2019), Personal history of other diseases of the musculoskeletal system and connective tissue (03/04/2019), Personal history of other diseases of the musculoskeletal system and connective tissue (03/15/2019), Personal history of other  diseases of the respiratory system, Personal history of other diseases of the respiratory system (11/17/2021), Personal history of other diseases of the respiratory system (02/03/2021), Personal history of other diseases of the respiratory system (12/13/2017), Personal history of other diseases of urinary system, Personal history of other endocrine, nutritional and metabolic disease (01/15/2019), Personal history of other specified conditions (10/28/2019), Personal history of other specified conditions (01/09/2020), Personal history of other specified conditions (01/09/2020), Personal history of other specified conditions (04/01/2020), Personal history of other specified conditions (09/19/2019), and Sleep related leg cramps (01/09/2020).    Past Surgical History:  Kennedi Machado  has a past surgical history that includes Cholecystectomy (05/14/2018); Hysterectomy (05/14/2018); Appendectomy (05/14/2018); Other surgical history (11/09/2022); Other surgical history (09/23/2022); Other surgical history (09/23/2022); Other surgical history (09/23/2022); Colonoscopy (10/21/2020); and Freedom tooth extraction.    Family History:  Patient family history includes Asthma in her mother; Colon cancer in her mother; Emphysema in her father; dm2 in her brother; waldonstrom macroglobulinemia in her brother.    Social History:  Kennedi Machado  reports that she has never smoked. She has never been exposed to tobacco smoke. She has never used smokeless tobacco. She reports current alcohol use of about 1.0 standard drink of alcohol per week. She reports that she does not use drugs.    Allergies:  Bisoprolol-hydrochlorothiazide, Ranolazine, Sulfa (sulfonamide antibiotics), and Diclofenac    Current Medications / CAM's:    Current Outpatient Medications:     amoxicillin (Amoxil) 500 mg capsule, TAKE 4 CAPSULES 1 HOUR PRIOR TO APPOINMENT, Disp: , Rfl:     aspirin 81 mg EC tablet, Take 1 tablet (81 mg) by mouth once daily., Disp: , Rfl:      atenolol (Tenormin) 25 mg tablet, Take 0.5 tablets (12.5 mg) by mouth once daily., Disp: 15 tablet, Rfl: 11    cholecalciferol (Vitamin D-3) 5,000 Units tablet, Take 1 tablet (125 mcg) by mouth once daily., Disp: , Rfl:     coenzyme Q-10 100 mg capsule, Take 1 capsule (100 mg) by mouth 2 times a day., Disp: , Rfl:     fluticasone (Flonase) 50 mcg/actuation nasal spray, Administer 2 sprays into each nostril once daily., Disp: , Rfl:     GaviLyte-G 236-22.74-6.74 -5.86 gram solution, TAKE AS DIRECTED, Disp: , Rfl:     isosorbide mononitrate ER (Imdur) 30 mg 24 hr tablet, Take 0.5 tablets (15 mg) by mouth once daily. Per patient., Disp: , Rfl:     levothyroxine (Synthroid, Levoxyl) 50 mcg tablet, TAKE 1 TABLET BY MOUTH EVERY DAY, Disp: 90 tablet, Rfl: 0    lisinopriL-hydrochlorothiazide 20-25 mg tablet, TAKE 1 TABLET BY MOUTH EVERY DAY, Disp: 90 tablet, Rfl: 0    nitroglycerin (Nitrostat) 0.4 mg SL tablet, Place 1 tablet (0.4 mg) under the tongue every 5 minutes if needed., Disp: , Rfl:     promethazine (Phenergan) 25 mg tablet, TAKE 1 BY MOUTH EVERY 4 HOURS AS NEEDED FOR NAUSEA AND VOMITING, Disp: , Rfl:     rosuvastatin (Crestor) 20 mg tablet, Take 1 tablet (20 mg) by mouth once daily., Disp: 90 tablet, Rfl: 3     Objective   Well appearing patient in no apparent distress; mood and affect are within normal limits.    A full examination was performed including scalp, head, eyes, ears, nose, lips, neck, chest, axillae, abdomen, back, buttocks, bilateral upper extremities, bilateral lower extremities, hands, feet, fingers, toes, fingernails, and toenails. All findings within normal limits unless otherwise noted below.    Assessment/Plan   1. Skin cancer screening    The patient presented for a routine skin examination today. There are no specific concerns regarding skin health and no new or changing moles, lesions, or rashes.     Assessment: Based on the comprehensive skin examination, there were no concerning or  abnormal findings. The patient's skin appeared to be in good health, without any notable dermatologic conditions or lesions.    Plan: Given the absence of any significant skin findings, no specific interventions or treatments are warranted at this time. The patient was educated on the importance of regular skin self-examinations and advised to promptly report any changes or concerns. Routine follow-up for a skin examination was recommended.    -These lesions have benign, reassuring patterns on dermoscopy.  -There were no concerning features found on exam today.  -Recommend continued self-observation, and to contact the office if any changes in nevi are  noticed.    Discussed/information given on safe sun practices and use of sunscreen, sun protective clothing or sun avoidance. Recommend to use OTC medication of sunscreen SPF 30 or higher on a daily basis prior to sun exposure to reduce the risk of skin cancer.    Contact Office if: Any lesions change in size, shape or color; itch, bum or bleed.         2. Nevus  Multiple benign appearing flesh colored to pigmented macules and papules     Plan: Counseling.  I counseled the patient regarding the following:  Instructions: Monthly self-skin checks to monitor for any changes in moles are recommended. Expectations: Benign Nevi are pigmented nests of cells within the skin.No treatment is necessary. Contact Office if: Any moles change in size, shape or color; itch, bum or bleed.    3. Lentigo  Scattered tan macules in sun-exposed areas.    Solar lentigo (a type of lentigo also known as a senile lentigo, age spot, or liver spot) is a benign pigmented macule appearing on fair-skinned individuals that is related to ultraviolet radiation (UVR) exposure, typically from the sun.     PLAN:  Limiting sun exposure through avoidance, protective clothing, and use of sunscreens can help prevent the appearance of solar lentigines.    If lesion changes or becomes symptomatic she should  "return to clinic    4. Seborrheic keratosis    Seborrheic keratoses (SKs) are extremely common benign neoplasms of the skin. There can be few or hundreds of these raised, \"stuck-on\"-appearing papules and plaques with well-defined borders. The cause is unknown, although there is a familial trait for the development of multiple SKs.      SKs tend to increase in incidence and number with increasing age.     Skin Care: Seborrheic Keratoses are benign. No treatment is necessary.    Patient was instructed to call the office if any lesions become irritated or inflamed        5. Notalgia paresthetica  Mid Back    PLAN:  Reassurance anti-itch medication can be helpful            "

## 2024-03-30 DIAGNOSIS — I10 PRIMARY HYPERTENSION: ICD-10-CM

## 2024-03-30 DIAGNOSIS — E03.9 HYPOTHYROIDISM, UNSPECIFIED TYPE: ICD-10-CM

## 2024-04-01 RX ORDER — LISINOPRIL AND HYDROCHLOROTHIAZIDE 20; 25 MG/1; MG/1
1 TABLET ORAL DAILY
Qty: 30 TABLET | Refills: 0 | Status: SHIPPED | OUTPATIENT
Start: 2024-04-01 | End: 2024-04-26

## 2024-04-01 RX ORDER — LEVOTHYROXINE SODIUM 50 UG/1
50 TABLET ORAL DAILY
Qty: 30 TABLET | Refills: 0 | Status: SHIPPED | OUTPATIENT
Start: 2024-04-01 | End: 2024-04-26

## 2024-04-01 NOTE — TELEPHONE ENCOUNTER
Med refill from pharmacy and patient approve      lisinopriL-hydrochlorothiazide 20-25 mg tablet   TAKE 1 TABLET BY MOUTH EVERY DAY     levothyroxine (Synthroid, Levoxyl) 50 mcg tablet    TAKE 1 TABLET BY MOUTH EVERY DAY     [facility] [address]

## 2024-04-02 ENCOUNTER — TELEPHONE (OUTPATIENT)
Dept: PRIMARY CARE | Facility: CLINIC | Age: 59
End: 2024-04-02
Payer: COMMERCIAL

## 2024-04-02 NOTE — TELEPHONE ENCOUNTER
Patient she has an appointment on Monday, 4/8/24 and want to know if she need blood work done before her appointment    Please advise

## 2024-04-03 DIAGNOSIS — I25.118 CORONARY ARTERY DISEASE OF NATIVE ARTERY OF NATIVE HEART WITH STABLE ANGINA PECTORIS (CMS-HCC): ICD-10-CM

## 2024-04-03 DIAGNOSIS — E78.2 MIXED HYPERLIPIDEMIA: ICD-10-CM

## 2024-04-03 RX ORDER — ROSUVASTATIN CALCIUM 20 MG/1
20 TABLET, COATED ORAL DAILY
Qty: 30 TABLET | Refills: 11 | OUTPATIENT
Start: 2024-04-03

## 2024-04-08 ENCOUNTER — OFFICE VISIT (OUTPATIENT)
Dept: PRIMARY CARE | Facility: CLINIC | Age: 59
End: 2024-04-08
Payer: COMMERCIAL

## 2024-04-08 VITALS
OXYGEN SATURATION: 95 % | DIASTOLIC BLOOD PRESSURE: 62 MMHG | SYSTOLIC BLOOD PRESSURE: 130 MMHG | BODY MASS INDEX: 34.48 KG/M2 | HEART RATE: 60 BPM | WEIGHT: 194.6 LBS | HEIGHT: 63 IN

## 2024-04-08 DIAGNOSIS — J45.909 REACTIVE AIRWAY DISEASE, UNSPECIFIED ASTHMA SEVERITY, UNCOMPLICATED (HHS-HCC): ICD-10-CM

## 2024-04-08 DIAGNOSIS — E03.9 HYPOTHYROIDISM, UNSPECIFIED TYPE: ICD-10-CM

## 2024-04-08 DIAGNOSIS — E78.5 HYPERLIPIDEMIA, UNSPECIFIED HYPERLIPIDEMIA TYPE: ICD-10-CM

## 2024-04-08 DIAGNOSIS — I25.118 CORONARY ARTERY DISEASE OF NATIVE ARTERY OF NATIVE HEART WITH STABLE ANGINA PECTORIS (CMS-HCC): Primary | ICD-10-CM

## 2024-04-08 DIAGNOSIS — J31.0 CHRONIC NONALLERGIC RHINITIS: ICD-10-CM

## 2024-04-08 DIAGNOSIS — I10 PRIMARY HYPERTENSION: ICD-10-CM

## 2024-04-08 DIAGNOSIS — I20.9 ANGINA PECTORIS (CMS-HCC): ICD-10-CM

## 2024-04-08 PROCEDURE — 3075F SYST BP GE 130 - 139MM HG: CPT | Performed by: INTERNAL MEDICINE

## 2024-04-08 PROCEDURE — 1036F TOBACCO NON-USER: CPT | Performed by: INTERNAL MEDICINE

## 2024-04-08 PROCEDURE — 99214 OFFICE O/P EST MOD 30 MIN: CPT | Performed by: INTERNAL MEDICINE

## 2024-04-08 PROCEDURE — 3078F DIAST BP <80 MM HG: CPT | Performed by: INTERNAL MEDICINE

## 2024-04-08 ASSESSMENT — ENCOUNTER SYMPTOMS
FATIGUE: 0
COUGH: 0
SINUS PRESSURE: 1
HEADACHES: 0
SORE THROAT: 0
DIZZINESS: 0
PALPITATIONS: 0
SHORTNESS OF BREATH: 0
WHEEZING: 0
TROUBLE SWALLOWING: 0
ABDOMINAL PAIN: 0
CHEST TIGHTNESS: 0
BLOOD IN STOOL: 0

## 2024-04-08 ASSESSMENT — PATIENT HEALTH QUESTIONNAIRE - PHQ9
1. LITTLE INTEREST OR PLEASURE IN DOING THINGS: NOT AT ALL
2. FEELING DOWN, DEPRESSED OR HOPELESS: NOT AT ALL
SUM OF ALL RESPONSES TO PHQ9 QUESTIONS 1 AND 2: 0

## 2024-04-08 ASSESSMENT — PAIN SCALES - GENERAL: PAINLEVEL: 0-NO PAIN

## 2024-04-08 NOTE — PROGRESS NOTES
Subjective   Patient ID: Kennedi Machado is a 58 y.o. female who presents for 4 month follow up for HTN, CAD with angina, and hypothyroidism.   Kennedi is doing well today. She is here for a 4 month follow up for HTN, cholesterol, hypothyroidism, and CAD with angina. She has not needed to use the nitroglycerin for chest pain in months and has been exercising with a  2-3 times per week. She has chronic sinus pressure and has noticed some blood in her tissues after blowing her nose due to dryness. She has not had to use her rescue inhaler in months and has no concerns with her asthma at this time. Her last blood work was completed in December 2023, mammogram in October 2023, and colonoscopy in December 2023. She does not want a Dexa scan at this time. She monitors her BP at home and is consistently around 130/60. She follows with Dr Paulino with cardiology.         Review of Systems   Constitutional:  Negative for fatigue.   HENT:  Positive for congestion, nosebleeds, postnasal drip and sinus pressure. Negative for sore throat and trouble swallowing.    Respiratory:  Negative for cough, chest tightness, shortness of breath and wheezing.    Cardiovascular:  Negative for chest pain, palpitations and leg swelling.   Gastrointestinal:  Negative for abdominal pain and blood in stool.   Neurological:  Negative for dizziness and headaches.       Objective   Medication Documentation Review Audit       Reviewed by Dea Sauceda MD (Physician) on 04/08/24 at 0931      Medication Order Taking? Sig Documenting Provider Last Dose Status   Discontinued 04/08/24 0920   aspirin 81 mg EC tablet [id number] No Take 1 tablet (81 mg) by mouth once daily. Historical Provider, MD Taking Active   atenolol (Tenormin) 25 mg tablet [id number] No Take 0.5 tablets (12.5 mg) by mouth once daily. Javi Aburto MD Taking Active   cholecalciferol (Vitamin D-3) 5,000 Units tablet [id number] No Take 1 tablet (125 mcg) by mouth once  daily. Historical Provider, MD Taking Active   coenzyme Q-10 100 mg capsule [id number] No Take 1 capsule (100 mg) by mouth 2 times a day. Historical Provider, MD Taking Active   fluticasone (Flonase) 50 mcg/actuation nasal spray [id number] No Administer 2 sprays into each nostril once daily. Historical Provider, MD Taking Active   Discontinued 24 0920   Discontinued 24 0920   levothyroxine (Synthroid, Levoxyl) 50 mcg tablet [id number]  TAKE 1 TABLET BY MOUTH EVERY DAY Dea Sauceda MD  Active   lisinopriL-hydrochlorothiazide 20-25 mg tablet [id number]  TAKE 1 TABLET BY MOUTH EVERY DAY Dea Sauceda MD  Active   nitroglycerin (Nitrostat) 0.4 mg SL tablet [id number] No Place 1 tablet (0.4 mg) under the tongue every 5 minutes if needed. Historical Provider, MD Taking Active   Discontinued 24 0920   rosuvastatin (Crestor) 20 mg tablet [id number]  Take 1 tablet (20 mg) by mouth once daily. Dea Sauceda MD   10/14/23 5085                  Allergies   Allergen Reactions    Bisoprolol-Hydrochlorothiazide Hives and Itching     hyper    Ranolazine Hives    Sulfa (Sulfonamide Antibiotics) Hives    Diclofenac Rash     Patient takes aspirin 81 mg daily with no issues.     Physical Exam  Constitutional:       General: She is not in acute distress.     Appearance: Normal appearance. She is obese. She is not ill-appearing.   HENT:      Head: Normocephalic and atraumatic.      Nose: Nose normal.   Eyes:      Extraocular Movements: Extraocular movements intact.      Conjunctiva/sclera: Conjunctivae normal.      Pupils: Pupils are equal, round, and reactive to light.   Cardiovascular:      Rate and Rhythm: Normal rate and regular rhythm.      Pulses: Normal pulses.      Heart sounds: Normal heart sounds.   Pulmonary:      Effort: Pulmonary effort is normal.      Breath sounds: Normal breath sounds.   Abdominal:      General: Abdomen is flat. Bowel sounds are normal.      Palpations: Abdomen is soft.  "  Musculoskeletal:      Right lower leg: No edema.      Left lower leg: No edema.   Skin:     General: Skin is warm.   Neurological:      General: No focal deficit present.      Mental Status: She is alert and oriented to person, place, and time. Mental status is at baseline.   Psychiatric:         Mood and Affect: Mood normal.         Behavior: Behavior normal.         Thought Content: Thought content normal.         Judgment: Judgment normal.     /62   Pulse 60   Ht 1.6 m (5' 3\")   Wt 88.3 kg (194 lb 9.6 oz)   SpO2 95%   BMI 34.47 kg/m²       Assessment/Plan   Problem List Items Addressed This Visit       Chronic nonallergic rhinitis     Chronic, stable on current regimen of flonase spray.          Coronary artery disease of native heart with stable angina pectoris (CMS/HCC) - Primary     Patient has not needed nitroglycerin for chest pain in months and feels generally healthy. She follows with Dr. Nice with cardiology.          Angina pectoris (CMS/HCC)     Patient denies using any nitro and any angina.         Hyperlipidemia     Fasting labs completed in December. Continue Crestor 20 mg.   Patient would not like a refill today as she has a large amount at home and will call the office when she needs more.          Hypertension     BP mildly elevated at 135/63 initially. Her manual BP was taken and measured at 132/62, followed by a final reading of 130/62 today in the office. She will continue to monitor her BP at home. Continue atenolol and lisinopril-hydrochlorothiazide.   No refills needed today per patient.          Hypothyroidism     Continue synthroid. TSH drawn in December and was normal.          Reactive airway disease, unspecified asthma severity, uncomplicated     Patient reports her asthma is stable and has not needed her rescue inhaler in months.           Please follow up with Dr Sauceda in 4 months.     Forms for her health insurance were also completed today.  This note or encounter " for this patient visit included a student.  I have independently interviewed the patient, performed a physical exam and performed my own assessment and plan and orders.         It has been a pleasure seeing you.  Dea Sauceda MD

## 2024-04-08 NOTE — ASSESSMENT & PLAN NOTE
Fasting labs completed in December. Continue Crestor 20 mg.   Patient would not like a refill today as she has a large amount at home and will call the office when she needs more.

## 2024-04-08 NOTE — ASSESSMENT & PLAN NOTE
BP mildly elevated at 135/63 initially. Her manual BP was taken and measured at 132/62, followed by a final reading of 130/62 today in the office. She will continue to monitor her BP at home. Continue atenolol and lisinopril-hydrochlorothiazide.   No refills needed today per patient.

## 2024-04-08 NOTE — ASSESSMENT & PLAN NOTE
Patient has not needed nitroglycerin for chest pain in months and feels generally healthy. She follows with Dr. Nice with cardiology.

## 2024-04-08 NOTE — PROGRESS NOTES
Subjective   Patient ID: Kennedi Machado is a 58 y.o. female who presents for 4 month follow up for HTN, thyroid, and cholesterol management.    HPI    Review of Systems    Objective   Medication Documentation Review Audit       Reviewed by Nohemy Childs LPN (Licensed Nurse) on 02/19/24 at 1301      Medication Order Taking? Sig Documenting Provider Last Dose Status   amoxicillin (Amoxil) 500 mg capsule [id number] No TAKE 4 CAPSULES 1 HOUR PRIOR TO APPOINMENT Historical Provider, MD Taking Active   aspirin 81 mg EC tablet [id number] No Take 1 tablet (81 mg) by mouth once daily. Historical Provider, MD Taking Active   atenolol (Tenormin) 25 mg tablet [id number] No Take 0.5 tablets (12.5 mg) by mouth once daily. Javi Aburto MD Taking Active   cholecalciferol (Vitamin D-3) 5,000 Units tablet [id number] No Take 1 tablet (125 mcg) by mouth once daily. Historical Provider, MD Taking Active   coenzyme Q-10 100 mg capsule [id number] No Take 1 capsule (100 mg) by mouth 2 times a day. Historical Provider, MD Taking Active   fluticasone (Flonase) 50 mcg/actuation nasal spray [id number] No Administer 2 sprays into each nostril once daily. Historical Provider, MD Taking Active   GaviLyte-G 236-22.74-6.74 -5.86 gram solution [id number] No TAKE AS DIRECTED Historical MD Malachi Taking Active   isosorbide mononitrate ER (Imdur) 30 mg 24 hr tablet [id number] No Take 0.5 tablets (15 mg) by mouth once daily. Per patient. Aristides Ly MD Taking Differently Active   levothyroxine (Synthroid, Levoxyl) 50 mcg tablet [id number]  TAKE 1 TABLET BY MOUTH EVERY DAY Dea Sauceda MD  Active   lisinopriL-hydrochlorothiazide 20-25 mg tablet [id number]  TAKE 1 TABLET BY MOUTH EVERY DAY Dea Sauceda MD  Active   nitroglycerin (Nitrostat) 0.4 mg SL tablet [id number] No Place 1 tablet (0.4 mg) under the tongue every 5 minutes if needed. Historical MD Malachi Taking Active   promethazine (Phenergan) 25 mg tablet [id number] No TAKE 1 BY MOUTH  "EVERY 4 HOURS AS NEEDED FOR NAUSEA AND VOMITING Historical Provider, MD Taking Active   rosuvastatin (Crestor) 20 mg tablet [id number]  Take 1 tablet (20 mg) by mouth once daily. Dea Sauceda MD   10/14/23 8259                  Allergies   Allergen Reactions   • Bisoprolol-Hydrochlorothiazide Hives and Itching     hyper   • Ranolazine Hives   • Sulfa (Sulfonamide Antibiotics) Hives   • Diclofenac Rash     Patient takes aspirin 81 mg daily with no issues.     Physical ExamBP 135/63 (BP Location: Left arm, Patient Position: Sitting)   Pulse 60   Ht 1.6 m (5' 3\")   Wt 88.3 kg (194 lb 9.6 oz)   SpO2 95%   BMI 34.47 kg/m²       Assessment/Plan   Problem List Items Addressed This Visit    None             It has been a pleasure seeing you.  Allie Khan MA   "

## 2024-05-17 ENCOUNTER — OFFICE VISIT (OUTPATIENT)
Dept: CARDIOLOGY | Facility: CLINIC | Age: 59
End: 2024-05-17
Payer: COMMERCIAL

## 2024-05-17 VITALS
OXYGEN SATURATION: 96 % | WEIGHT: 192 LBS | HEART RATE: 63 BPM | SYSTOLIC BLOOD PRESSURE: 152 MMHG | BODY MASS INDEX: 34.02 KG/M2 | DIASTOLIC BLOOD PRESSURE: 86 MMHG | HEIGHT: 63 IN

## 2024-05-17 DIAGNOSIS — I45.10 INCOMPLETE RBBB: ICD-10-CM

## 2024-05-17 DIAGNOSIS — I25.118 CORONARY ARTERY DISEASE OF NATIVE ARTERY OF NATIVE HEART WITH STABLE ANGINA PECTORIS (CMS-HCC): ICD-10-CM

## 2024-05-17 DIAGNOSIS — I10 PRIMARY HYPERTENSION: ICD-10-CM

## 2024-05-17 PROCEDURE — 3077F SYST BP >= 140 MM HG: CPT | Performed by: INTERNAL MEDICINE

## 2024-05-17 PROCEDURE — 99214 OFFICE O/P EST MOD 30 MIN: CPT | Performed by: INTERNAL MEDICINE

## 2024-05-17 PROCEDURE — 3079F DIAST BP 80-89 MM HG: CPT | Performed by: INTERNAL MEDICINE

## 2024-05-17 PROCEDURE — 93000 ELECTROCARDIOGRAM COMPLETE: CPT | Performed by: INTERNAL MEDICINE

## 2024-05-17 NOTE — LETTER
May 17, 2024       No Recipients    Patient: Kennedi Machado   YOB: 1965   Date of Visit: 5/17/2024       Dear Dr. Sweeney Recipients:    Thank you for referring Kennedi Machado to me for evaluation. Below are my notes for this consultation.  If you have questions, please do not hesitate to call me. I look forward to following your patient along with you.       Sincerely,     Javi Aburto MD      CC:   No Recipients  ______________________________________________________________________________________        [facility] Cardiology Outpatient Follow-up Visit     Reason for Visit: CAD    HPI: Kennedi Machado is a 58 y.o.  female who presents today for followup.     The patient is a 58-year-old female with a history of coronary artery disease status post stent implantation who presents with chest discomfort. She admits to an occasional chest discomfort once per week.  She admits to an occasional palpitation once per month.  She denies any shortness of breath.  She denies any dyspnea on exertion. She denies any lightheadedness, syncope, orthopnea, PND, lower extremity edema.      She decreased her isosorbide to 15 mg daily.  She has been a little more symptomatic since that time.     5/21/2020 cardiac catheterization: Patent proximal LAD stent, 80% ostial OM1 stenosis suitable for medical therapy, mild RCA disease, normal left heart filling pressures. 10/3/2019 cardiac catheterization: ROSA of the proximal LAD. 9/8/2022 MPI: Normal perfusion, ejection fraction 83%. 9/8/2022 ECG: Normal sinus rhythm, RSR pattern. 10/10/2022 catheterization: Stable CAD since 2020. Patent LAD stent. Mild circumflex and RCA disease. Branch vessel disease of OM1 suitable for medical therapy based on vessel size and lesion location.  7/29/2023 , LDL 79, HDL 55, triglycerides 141.  5/17/2024 ECG: Normal sinus rhythm, incomplete right bundle branch block.    Past Medical History:   She has a past medical history of Abnormal  levels of other serum enzymes (12/01/2019), Acute maxillary sinusitis, unspecified (12/20/2019), Allergy, unspecified, initial encounter (05/24/2020), Chondrocostal junction syndrome (tietze) (05/14/2018), Chronic sinusitis, unspecified (04/12/2018), Complication of other artery following a procedure, not elsewhere classified, initial encounter (10/24/2019), Contact with and (suspected) exposure to asbestos (05/14/2018), Encounter for follow-up examination after completed treatment for conditions other than malignant neoplasm (10/07/2019), Encounter for screening for malignant neoplasm of skin (12/28/2018), Low back pain, unspecified (03/15/2019), Muscle spasm of back (03/15/2019), Other conditions influencing health status (01/05/2021), Other injury of unspecified body region, initial encounter (10/07/2019), Other specified abnormal findings of blood chemistry (08/29/2019), Personal history of colonic polyps (06/04/2020), Personal history of diseases of the blood and blood-forming organs and certain disorders involving the immune mechanism (06/30/2020), Personal history of other benign neoplasm (12/28/2018), Personal history of other diseases of the circulatory system (10/24/2019), Personal history of other diseases of the musculoskeletal system and connective tissue (03/04/2019), Personal history of other diseases of the musculoskeletal system and connective tissue (03/15/2019), Personal history of other diseases of the respiratory system, Personal history of other diseases of the respiratory system (11/17/2021), Personal history of other diseases of the respiratory system (02/03/2021), Personal history of other diseases of the respiratory system (12/13/2017), Personal history of other diseases of urinary system, Personal history of other endocrine, nutritional and metabolic disease (01/15/2019), Personal history of other specified conditions (10/28/2019), Personal history of other specified conditions  (01/09/2020), Personal history of other specified conditions (01/09/2020), Personal history of other specified conditions (04/01/2020), Personal history of other specified conditions (09/19/2019), and Sleep related leg cramps (01/09/2020).    Surgical History:   She has a past surgical history that includes Cholecystectomy (05/14/2018); Hysterectomy (05/14/2018); Appendectomy (05/14/2018); Other surgical history (11/09/2022); Other surgical history (09/23/2022); Other surgical history (09/23/2022); Other surgical history (09/23/2022); Colonoscopy (10/21/2020); and [place] tooth extraction.    Family History:   Family History   Problem Relation Name Age of Onset   • Colon cancer Mother     • Asthma Mother     • Emphysema Father     • Other (dm2) Brother     • Other (waldonstrom macroglobulinemia) Brother         Allergies:  Bisoprolol-hydrochlorothiazide, Ranolazine, Sulfa (sulfonamide antibiotics), and Diclofenac     Social History:   No cigarettes, social alcohol, no drugs     Prior Cardiovascular Testing (Personally Reviewed):     Review of Systems:  Review of Systems   All other systems reviewed and are negative.      Outpatient Medications:    Current Outpatient Medications:   •  aspirin 81 mg EC tablet, Take 1 tablet (81 mg) by mouth once daily., Disp: , Rfl:   •  atenolol (Tenormin) 25 mg tablet, Take 0.5 tablets (12.5 mg) by mouth once daily., Disp: 15 tablet, Rfl: 11  •  cholecalciferol (Vitamin D-3) 5,000 Units tablet, Take 1 tablet (125 mcg) by mouth once daily., Disp: , Rfl:   •  coenzyme Q-10 100 mg capsule, Take 1 capsule (100 mg) by mouth 2 times a day., Disp: , Rfl:   •  fluticasone (Flonase) 50 mcg/actuation nasal spray, Administer 2 sprays into each nostril once daily., Disp: , Rfl:   •  levothyroxine (Synthroid, Levoxyl) 50 mcg tablet, TAKE 1 TABLET BY MOUTH EVERY DAY, Disp: 90 tablet, Rfl: 0  •  lisinopriL-hydrochlorothiazide 20-25 mg tablet, TAKE 1 TABLET BY MOUTH EVERY DAY, Disp: 90 tablet, Rfl:  0  •  nitroglycerin (Nitrostat) 0.4 mg SL tablet, Place 1 tablet (0.4 mg) under the tongue every 5 minutes if needed., Disp: , Rfl:   •  rosuvastatin (Crestor) 20 mg tablet, Take 1 tablet (20 mg) by mouth once daily., Disp: 90 tablet, Rfl: 3     Last Recorded Vitals  There were no vitals taken for this visit.    Physical Exam:    Physical Exam  Vitals reviewed.   Constitutional:       Appearance: Normal appearance.   HENT:      Head: Normocephalic and atraumatic.      Mouth/Throat:      Mouth: Mucous membranes are moist.      Pharynx: Oropharynx is clear.   Eyes:      Extraocular Movements: Extraocular movements intact.      Conjunctiva/sclera: Conjunctivae normal.   Cardiovascular:      Rate and Rhythm: Normal rate and regular rhythm.      Pulses: Normal pulses.      Heart sounds: Normal heart sounds.   Pulmonary:      Effort: Pulmonary effort is normal.      Breath sounds: Normal breath sounds.   Abdominal:      General: Bowel sounds are normal.      Palpations: Abdomen is soft.   Musculoskeletal:         General: No swelling.      Cervical back: Neck supple.   Skin:     General: Skin is warm and dry.   Neurological:      General: No focal deficit present.      Mental Status: She is alert.   Psychiatric:         Mood and Affect: Mood normal.         Behavior: Behavior normal.         Lab/Radiology/Diagnostic Review:    Labs    Lab Results   Component Value Date    GLUCOSE 118 (H) 12/09/2023    CALCIUM 9.1 12/09/2023     12/09/2023    K 3.9 12/09/2023    CO2 30 12/09/2023     12/09/2023    BUN 14 12/09/2023    CREATININE 0.92 12/09/2023       Lab Results   Component Value Date    WBC 4.7 12/09/2023    HGB 12.3 12/09/2023    HCT 37.9 12/09/2023    MCV 93 12/09/2023    PLT 87 (L) 12/09/2023       Lab Results   Component Value Date    CHOL 161 12/09/2023    CHOL 163 07/29/2023    CHOL 177 04/12/2023     Lab Results   Component Value Date    HDL 52.5 12/09/2023    HDL 55.4 07/29/2023    HDL 58.4 04/12/2023  "    Lab Results   Component Value Date    LDLCALC 84 12/09/2023     Lab Results   Component Value Date    TRIG 123 12/09/2023    TRIG 141 07/29/2023    TRIG 125 04/12/2023     No components found for: \"CHOLHDL\"    Lab Results   Component Value Date    BNP 35 02/09/2022       Lab Results   Component Value Date    TSH 1.87 12/09/2023       Assessment:   1.  CAD status post stent implantation  2.  Stable angina  3.  Hypertension  4.  Hyperlipidemia  5.  Palpitations     Overall Plan:  Patient is doing reasonably well from a cardiac standpoint.  Her heart rate going to the 40s on atenolol.  Continue atenolol 12.5 mg daily.  She can increase this to 25 mg daily if she feels more palpitations.     Patient will continue isosorbide 15 mg daily and lisinopril/hydrochlorothiazide.  Patient had hives to ranolazine in the past.  Likely nearing the limits of medical therapy for her angina.  Could theoretically stop her lisinopril/hydrochlorothiazide and start calcium channel blocker if needed.     Patient will stay on aspirin therapy. She will stay on statin therapy.     Patient will follow up with me in 12 months or sooner if she has more problems.     Javi Aburto MD    "

## 2024-05-17 NOTE — PROGRESS NOTES
[facility] Cardiology Outpatient Follow-up Visit     Reason for Visit: CAD    HPI: Kennedi Machado is a 58 y.o.  female who presents today for followup.     The patient is a 58-year-old female with a history of coronary artery disease status post stent implantation who presents with chest discomfort. She admits to an occasional chest discomfort once per week.  She admits to an occasional palpitation once per month.  She denies any shortness of breath.  She denies any dyspnea on exertion. She denies any lightheadedness, syncope, orthopnea, PND, lower extremity edema.      She decreased her isosorbide to 15 mg daily.  She has been a little more symptomatic since that time.     5/21/2020 cardiac catheterization: Patent proximal LAD stent, 80% ostial OM1 stenosis suitable for medical therapy, mild RCA disease, normal left heart filling pressures. 10/3/2019 cardiac catheterization: ROSA of the proximal LAD. 9/8/2022 MPI: Normal perfusion, ejection fraction 83%. 9/8/2022 ECG: Normal sinus rhythm, RSR pattern. 10/10/2022 catheterization: Stable CAD since 2020. Patent LAD stent. Mild circumflex and RCA disease. Branch vessel disease of OM1 suitable for medical therapy based on vessel size and lesion location.  7/29/2023 , LDL 79, HDL 55, triglycerides 141.  5/17/2024 ECG: Normal sinus rhythm, incomplete right bundle branch block.    Past Medical History:   She has a past medical history of Abnormal levels of other serum enzymes (12/01/2019), Acute maxillary sinusitis, unspecified (12/20/2019), Allergy, unspecified, initial encounter (05/24/2020), Chondrocostal junction syndrome (tietze) (05/14/2018), Chronic sinusitis, unspecified (04/12/2018), Complication of other artery following a procedure, not elsewhere classified, initial encounter (10/24/2019), Contact with and (suspected) exposure to asbestos (05/14/2018), Encounter for follow-up examination after completed treatment for conditions other than malignant  neoplasm (10/07/2019), Encounter for screening for malignant neoplasm of skin (12/28/2018), Low back pain, unspecified (03/15/2019), Muscle spasm of back (03/15/2019), Other conditions influencing health status (01/05/2021), Other injury of unspecified body region, initial encounter (10/07/2019), Other specified abnormal findings of blood chemistry (08/29/2019), Personal history of colonic polyps (06/04/2020), Personal history of diseases of the blood and blood-forming organs and certain disorders involving the immune mechanism (06/30/2020), Personal history of other benign neoplasm (12/28/2018), Personal history of other diseases of the circulatory system (10/24/2019), Personal history of other diseases of the musculoskeletal system and connective tissue (03/04/2019), Personal history of other diseases of the musculoskeletal system and connective tissue (03/15/2019), Personal history of other diseases of the respiratory system, Personal history of other diseases of the respiratory system (11/17/2021), Personal history of other diseases of the respiratory system (02/03/2021), Personal history of other diseases of the respiratory system (12/13/2017), Personal history of other diseases of urinary system, Personal history of other endocrine, nutritional and metabolic disease (01/15/2019), Personal history of other specified conditions (10/28/2019), Personal history of other specified conditions (01/09/2020), Personal history of other specified conditions (01/09/2020), Personal history of other specified conditions (04/01/2020), Personal history of other specified conditions (09/19/2019), and Sleep related leg cramps (01/09/2020).    Surgical History:   She has a past surgical history that includes Cholecystectomy (05/14/2018); Hysterectomy (05/14/2018); Appendectomy (05/14/2018); Other surgical history (11/09/2022); Other surgical history (09/23/2022); Other surgical history (09/23/2022); Other surgical history  (09/23/2022); Colonoscopy (10/21/2020); and [place] tooth extraction.    Family History:   Family History   Problem Relation Name Age of Onset    Colon cancer Mother      Asthma Mother      Emphysema Father      Other (dm2) Brother      Other (waldonstrom macroglobulinemia) Brother         Allergies:  Bisoprolol-hydrochlorothiazide, Ranolazine, Sulfa (sulfonamide antibiotics), and Diclofenac     Social History:   No cigarettes, social alcohol, no drugs     Prior Cardiovascular Testing (Personally Reviewed):     Review of Systems:  Review of Systems   All other systems reviewed and are negative.      Outpatient Medications:    Current Outpatient Medications:     aspirin 81 mg EC tablet, Take 1 tablet (81 mg) by mouth once daily., Disp: , Rfl:     atenolol (Tenormin) 25 mg tablet, Take 0.5 tablets (12.5 mg) by mouth once daily., Disp: 15 tablet, Rfl: 11    cholecalciferol (Vitamin D-3) 5,000 Units tablet, Take 1 tablet (125 mcg) by mouth once daily., Disp: , Rfl:     coenzyme Q-10 100 mg capsule, Take 1 capsule (100 mg) by mouth 2 times a day., Disp: , Rfl:     fluticasone (Flonase) 50 mcg/actuation nasal spray, Administer 2 sprays into each nostril once daily., Disp: , Rfl:     levothyroxine (Synthroid, Levoxyl) 50 mcg tablet, TAKE 1 TABLET BY MOUTH EVERY DAY, Disp: 90 tablet, Rfl: 0    lisinopriL-hydrochlorothiazide 20-25 mg tablet, TAKE 1 TABLET BY MOUTH EVERY DAY, Disp: 90 tablet, Rfl: 0    nitroglycerin (Nitrostat) 0.4 mg SL tablet, Place 1 tablet (0.4 mg) under the tongue every 5 minutes if needed., Disp: , Rfl:     rosuvastatin (Crestor) 20 mg tablet, Take 1 tablet (20 mg) by mouth once daily., Disp: 90 tablet, Rfl: 3     Last Recorded Vitals  There were no vitals taken for this visit.    Physical Exam:    Physical Exam  Vitals reviewed.   Constitutional:       Appearance: Normal appearance.   HENT:      Head: Normocephalic and atraumatic.      Mouth/Throat:      Mouth: Mucous membranes are moist.      Pharynx:  "Oropharynx is clear.   Eyes:      Extraocular Movements: Extraocular movements intact.      Conjunctiva/sclera: Conjunctivae normal.   Cardiovascular:      Rate and Rhythm: Normal rate and regular rhythm.      Pulses: Normal pulses.      Heart sounds: Normal heart sounds.   Pulmonary:      Effort: Pulmonary effort is normal.      Breath sounds: Normal breath sounds.   Abdominal:      General: Bowel sounds are normal.      Palpations: Abdomen is soft.   Musculoskeletal:         General: No swelling.      Cervical back: Neck supple.   Skin:     General: Skin is warm and dry.   Neurological:      General: No focal deficit present.      Mental Status: She is alert.   Psychiatric:         Mood and Affect: Mood normal.         Behavior: Behavior normal.         Lab/Radiology/Diagnostic Review:    Labs    Lab Results   Component Value Date    GLUCOSE 118 (H) 12/09/2023    CALCIUM 9.1 12/09/2023     12/09/2023    K 3.9 12/09/2023    CO2 30 12/09/2023     12/09/2023    BUN 14 12/09/2023    CREATININE 0.92 12/09/2023       Lab Results   Component Value Date    WBC 4.7 12/09/2023    HGB 12.3 12/09/2023    HCT 37.9 12/09/2023    MCV 93 12/09/2023    PLT 87 (L) 12/09/2023       Lab Results   Component Value Date    CHOL 161 12/09/2023    CHOL 163 07/29/2023    CHOL 177 04/12/2023     Lab Results   Component Value Date    HDL 52.5 12/09/2023    HDL 55.4 07/29/2023    HDL 58.4 04/12/2023     Lab Results   Component Value Date    LDLCALC 84 12/09/2023     Lab Results   Component Value Date    TRIG 123 12/09/2023    TRIG 141 07/29/2023    TRIG 125 04/12/2023     No components found for: \"CHOLHDL\"    Lab Results   Component Value Date    BNP 35 02/09/2022       Lab Results   Component Value Date    TSH 1.87 12/09/2023       Assessment:   1.  CAD status post stent implantation  2.  Stable angina  3.  Hypertension  4.  Hyperlipidemia  5.  Palpitations     Overall Plan:  Patient is doing reasonably well from a cardiac " standpoint.  Her heart rate going to the 40s on atenolol.  Continue atenolol 12.5 mg daily.  She can increase this to 25 mg daily if she feels more palpitations.     Patient will continue isosorbide 15 mg daily and lisinopril/hydrochlorothiazide.  Patient had hives to ranolazine in the past.  Likely nearing the limits of medical therapy for her angina.  Could theoretically stop her lisinopril/hydrochlorothiazide and start calcium channel blocker if needed.     Patient will stay on aspirin therapy. She will stay on statin therapy.     Patient will follow up with me in 12 months or sooner if she has more problems.     Javi Aburto MD

## 2024-05-17 NOTE — LETTER
May 17, 2024     Dea Sauceda MD  [address]  [facility], [address]  [facility] [address]    Patient: Kennedi Machado   YOB: 1965   Date of Visit: 5/17/2024       Dear Dr. Dea Sauceda MD:    Thank you for referring Kennedi Machado to me for evaluation. Below are my notes for this consultation.  If you have questions, please do not hesitate to call me. I look forward to following your patient along with you.       Sincerely,     Javi Aburto MD      CC: No Recipients  ______________________________________________________________________________________        [facility] Cardiology Outpatient Follow-up Visit     Reason for Visit: CAD    HPI: Kennedi Machado is a 58 y.o.  female who presents today for followup.     The patient is a 58-year-old female with a history of coronary artery disease status post stent implantation who presents with chest discomfort. She admits to an occasional chest discomfort once per week.  She admits to an occasional palpitation once per month.  She denies any shortness of breath.  She denies any dyspnea on exertion. She denies any lightheadedness, syncope, orthopnea, PND, lower extremity edema.      She decreased her isosorbide to 15 mg daily.  She has been a little more symptomatic since that time.     5/21/2020 cardiac catheterization: Patent proximal LAD stent, 80% ostial OM1 stenosis suitable for medical therapy, mild RCA disease, normal left heart filling pressures. 10/3/2019 cardiac catheterization: ROSA of the proximal LAD. 9/8/2022 MPI: Normal perfusion, ejection fraction 83%. 9/8/2022 ECG: Normal sinus rhythm, RSR pattern. 10/10/2022 catheterization: Stable CAD since 2020. Patent LAD stent. Mild circumflex and RCA disease. Branch vessel disease of OM1 suitable for medical therapy based on vessel size and lesion location.  7/29/2023 , LDL 79, HDL 55, triglycerides 141.  5/17/2024 ECG: Normal sinus rhythm, incomplete right bundle branch  block.    Past Medical History:   She has a past medical history of Abnormal levels of other serum enzymes (12/01/2019), Acute maxillary sinusitis, unspecified (12/20/2019), Allergy, unspecified, initial encounter (05/24/2020), Chondrocostal junction syndrome (tietze) (05/14/2018), Chronic sinusitis, unspecified (04/12/2018), Complication of other artery following a procedure, not elsewhere classified, initial encounter (10/24/2019), Contact with and (suspected) exposure to asbestos (05/14/2018), Encounter for follow-up examination after completed treatment for conditions other than malignant neoplasm (10/07/2019), Encounter for screening for malignant neoplasm of skin (12/28/2018), Low back pain, unspecified (03/15/2019), Muscle spasm of back (03/15/2019), Other conditions influencing health status (01/05/2021), Other injury of unspecified body region, initial encounter (10/07/2019), Other specified abnormal findings of blood chemistry (08/29/2019), Personal history of colonic polyps (06/04/2020), Personal history of diseases of the blood and blood-forming organs and certain disorders involving the immune mechanism (06/30/2020), Personal history of other benign neoplasm (12/28/2018), Personal history of other diseases of the circulatory system (10/24/2019), Personal history of other diseases of the musculoskeletal system and connective tissue (03/04/2019), Personal history of other diseases of the musculoskeletal system and connective tissue (03/15/2019), Personal history of other diseases of the respiratory system, Personal history of other diseases of the respiratory system (11/17/2021), Personal history of other diseases of the respiratory system (02/03/2021), Personal history of other diseases of the respiratory system (12/13/2017), Personal history of other diseases of urinary system, Personal history of other endocrine, nutritional and metabolic disease (01/15/2019), Personal history of other specified  conditions (10/28/2019), Personal history of other specified conditions (01/09/2020), Personal history of other specified conditions (01/09/2020), Personal history of other specified conditions (04/01/2020), Personal history of other specified conditions (09/19/2019), and Sleep related leg cramps (01/09/2020).    Surgical History:   She has a past surgical history that includes Cholecystectomy (05/14/2018); Hysterectomy (05/14/2018); Appendectomy (05/14/2018); Other surgical history (11/09/2022); Other surgical history (09/23/2022); Other surgical history (09/23/2022); Other surgical history (09/23/2022); Colonoscopy (10/21/2020); and Kinzers tooth extraction.    Family History:   Family History   Problem Relation Name Age of Onset   • Colon cancer Mother     • Asthma Mother     • Emphysema Father     • Other (dm2) Brother     • Other (waldonstrom macroglobulinemia) Brother         Allergies:  Bisoprolol-hydrochlorothiazide, Ranolazine, Sulfa (sulfonamide antibiotics), and Diclofenac     Social History:   No cigarettes, social alcohol, no drugs     Prior Cardiovascular Testing (Personally Reviewed):     Review of Systems:  Review of Systems   All other systems reviewed and are negative.      Outpatient Medications:    Current Outpatient Medications:   •  aspirin 81 mg EC tablet, Take 1 tablet (81 mg) by mouth once daily., Disp: , Rfl:   •  atenolol (Tenormin) 25 mg tablet, Take 0.5 tablets (12.5 mg) by mouth once daily., Disp: 15 tablet, Rfl: 11  •  cholecalciferol (Vitamin D-3) 5,000 Units tablet, Take 1 tablet (125 mcg) by mouth once daily., Disp: , Rfl:   •  coenzyme Q-10 100 mg capsule, Take 1 capsule (100 mg) by mouth 2 times a day., Disp: , Rfl:   •  fluticasone (Flonase) 50 mcg/actuation nasal spray, Administer 2 sprays into each nostril once daily., Disp: , Rfl:   •  levothyroxine (Synthroid, Levoxyl) 50 mcg tablet, TAKE 1 TABLET BY MOUTH EVERY DAY, Disp: 90 tablet, Rfl: 0  •  lisinopriL-hydrochlorothiazide  20-25 mg tablet, TAKE 1 TABLET BY MOUTH EVERY DAY, Disp: 90 tablet, Rfl: 0  •  nitroglycerin (Nitrostat) 0.4 mg SL tablet, Place 1 tablet (0.4 mg) under the tongue every 5 minutes if needed., Disp: , Rfl:   •  rosuvastatin (Crestor) 20 mg tablet, Take 1 tablet (20 mg) by mouth once daily., Disp: 90 tablet, Rfl: 3     Last Recorded Vitals  There were no vitals taken for this visit.    Physical Exam:    Physical Exam  Vitals reviewed.   Constitutional:       Appearance: Normal appearance.   HENT:      Head: Normocephalic and atraumatic.      Mouth/Throat:      Mouth: Mucous membranes are moist.      Pharynx: Oropharynx is clear.   Eyes:      Extraocular Movements: Extraocular movements intact.      Conjunctiva/sclera: Conjunctivae normal.   Cardiovascular:      Rate and Rhythm: Normal rate and regular rhythm.      Pulses: Normal pulses.      Heart sounds: Normal heart sounds.   Pulmonary:      Effort: Pulmonary effort is normal.      Breath sounds: Normal breath sounds.   Abdominal:      General: Bowel sounds are normal.      Palpations: Abdomen is soft.   Musculoskeletal:         General: No swelling.      Cervical back: Neck supple.   Skin:     General: Skin is warm and dry.   Neurological:      General: No focal deficit present.      Mental Status: She is alert.   Psychiatric:         Mood and Affect: Mood normal.         Behavior: Behavior normal.         Lab/Radiology/Diagnostic Review:    Labs    Lab Results   Component Value Date    GLUCOSE 118 (H) 12/09/2023    CALCIUM 9.1 12/09/2023     12/09/2023    K 3.9 12/09/2023    CO2 30 12/09/2023     12/09/2023    BUN 14 12/09/2023    CREATININE 0.92 12/09/2023       Lab Results   Component Value Date    WBC 4.7 12/09/2023    HGB 12.3 12/09/2023    HCT 37.9 12/09/2023    MCV 93 12/09/2023    PLT 87 (L) 12/09/2023       Lab Results   Component Value Date    CHOL 161 12/09/2023    CHOL 163 07/29/2023    CHOL 177 04/12/2023     Lab Results   Component Value  "Date    HDL 52.5 12/09/2023    HDL 55.4 07/29/2023    HDL 58.4 04/12/2023     Lab Results   Component Value Date    LDLCALC 84 12/09/2023     Lab Results   Component Value Date    TRIG 123 12/09/2023    TRIG 141 07/29/2023    TRIG 125 04/12/2023     No components found for: \"CHOLHDL\"    Lab Results   Component Value Date    BNP 35 02/09/2022       Lab Results   Component Value Date    TSH 1.87 12/09/2023       Assessment:   1.  CAD status post stent implantation  2.  Stable angina  3.  Hypertension  4.  Hyperlipidemia  5.  Palpitations     Overall Plan:  Patient is doing reasonably well from a cardiac standpoint.  Her heart rate going to the 40s on atenolol.  Continue atenolol 12.5 mg daily.  She can increase this to 25 mg daily if she feels more palpitations.     Patient will continue isosorbide 15 mg daily and lisinopril/hydrochlorothiazide.  Patient had hives to ranolazine in the past.  Likely nearing the limits of medical therapy for her angina.  Could theoretically stop her lisinopril/hydrochlorothiazide and start calcium channel blocker if needed.     Patient will stay on aspirin therapy. She will stay on statin therapy.     Patient will follow up with me in 12 months or sooner if she has more problems.     Javi Aburto MD      "

## 2024-08-14 ENCOUNTER — APPOINTMENT (OUTPATIENT)
Dept: PRIMARY CARE | Facility: CLINIC | Age: 59
End: 2024-08-14
Payer: COMMERCIAL

## 2024-08-14 VITALS
OXYGEN SATURATION: 98 % | WEIGHT: 191.8 LBS | DIASTOLIC BLOOD PRESSURE: 70 MMHG | BODY MASS INDEX: 33.98 KG/M2 | SYSTOLIC BLOOD PRESSURE: 128 MMHG | HEIGHT: 63 IN | HEART RATE: 59 BPM

## 2024-08-14 DIAGNOSIS — E03.9 HYPOTHYROIDISM, UNSPECIFIED TYPE: ICD-10-CM

## 2024-08-14 DIAGNOSIS — E78.2 MIXED HYPERLIPIDEMIA: ICD-10-CM

## 2024-08-14 DIAGNOSIS — Z00.00 ANNUAL PHYSICAL EXAM: ICD-10-CM

## 2024-08-14 DIAGNOSIS — Z12.31 ENCOUNTER FOR SCREENING MAMMOGRAM FOR BREAST CANCER: Primary | ICD-10-CM

## 2024-08-14 DIAGNOSIS — I25.118 CORONARY ARTERY DISEASE OF NATIVE ARTERY OF NATIVE HEART WITH STABLE ANGINA PECTORIS (CMS-HCC): ICD-10-CM

## 2024-08-14 DIAGNOSIS — I10 PRIMARY HYPERTENSION: ICD-10-CM

## 2024-08-14 PROBLEM — H69.92 CHRONIC DYSFUNCTION OF LEFT EUSTACHIAN TUBE: Status: RESOLVED | Noted: 2023-08-25 | Resolved: 2024-08-14

## 2024-08-14 PROCEDURE — 1036F TOBACCO NON-USER: CPT | Performed by: INTERNAL MEDICINE

## 2024-08-14 PROCEDURE — 3078F DIAST BP <80 MM HG: CPT | Performed by: INTERNAL MEDICINE

## 2024-08-14 PROCEDURE — 3074F SYST BP LT 130 MM HG: CPT | Performed by: INTERNAL MEDICINE

## 2024-08-14 PROCEDURE — 3008F BODY MASS INDEX DOCD: CPT | Performed by: INTERNAL MEDICINE

## 2024-08-14 PROCEDURE — 99214 OFFICE O/P EST MOD 30 MIN: CPT | Performed by: INTERNAL MEDICINE

## 2024-08-14 RX ORDER — LISINOPRIL AND HYDROCHLOROTHIAZIDE 20; 25 MG/1; MG/1
1 TABLET ORAL DAILY
Qty: 30 TABLET | Refills: 11 | Status: SHIPPED | OUTPATIENT
Start: 2024-08-14

## 2024-08-14 RX ORDER — LEVOTHYROXINE SODIUM 50 UG/1
50 TABLET ORAL DAILY
Qty: 30 TABLET | Refills: 11 | Status: SHIPPED | OUTPATIENT
Start: 2024-08-14

## 2024-08-14 RX ORDER — ROSUVASTATIN CALCIUM 20 MG/1
20 TABLET, COATED ORAL DAILY
Qty: 30 TABLET | Refills: 11 | Status: SHIPPED | OUTPATIENT
Start: 2024-08-14

## 2024-08-14 ASSESSMENT — ENCOUNTER SYMPTOMS
DIZZINESS: 0
CONSTIPATION: 0
NAUSEA: 0
FEVER: 0
PALPITATIONS: 0
COUGH: 0
ABDOMINAL PAIN: 0
FREQUENCY: 0
DIARRHEA: 0
FATIGUE: 0
ARTHRALGIAS: 0
TROUBLE SWALLOWING: 0
LIGHT-HEADEDNESS: 0
SHORTNESS OF BREATH: 0
SORE THROAT: 0
VOMITING: 0
DYSURIA: 0

## 2024-08-14 ASSESSMENT — PAIN SCALES - GENERAL: PAINLEVEL: 2

## 2024-08-14 NOTE — PATIENT INSTRUCTIONS
Get mammo after 10/10/24  Get fasting labs in Dec before next appointment    Follow up Dr Sauceda in 4 months  30 min appointment

## 2024-08-14 NOTE — PROGRESS NOTES
Subjective   Patient ID: Kennedi Machado is a 58 y.o. female who presents for 4 month re for HTN management.  Patient is here for routine follow-up on her cholesterol hypertension and thyroid disease.  She recently went to [place] where her  had a family reunion while hiking she ended up having edema above her sock line and is multiple small petechial hemorrhages from hiking and walking so much.  They have now healed over and are gone but she did show me a picture on her phone.  Encouraged her to wear compression hose while hiking to prevent this from happening in the future        Review of Systems   Constitutional:  Negative for fatigue and fever.   HENT:  Negative for sore throat and trouble swallowing.    Eyes:  Negative for visual disturbance.   Respiratory:  Negative for cough and shortness of breath.    Cardiovascular:  Negative for chest pain, palpitations and leg swelling.   Gastrointestinal:  Negative for abdominal pain, constipation, diarrhea, nausea and vomiting.   Genitourinary:  Negative for dysuria and frequency.   Musculoskeletal:  Negative for arthralgias.   Skin:  Negative for rash.   Neurological:  Negative for dizziness and light-headedness.       Objective   Medication Documentation Review Audit       Reviewed by Amie Weber MA (Medical Assistant) on 05/17/24 at 1448      Medication Order Taking? Sig Documenting Provider Last Dose Status   aspirin 81 mg EC tablet [id number] Yes Take 1 tablet (81 mg) by mouth once daily. Historical Provider, MD Taking Active   atenolol (Tenormin) 25 mg tablet [id number] Yes Take 0.5 tablets (12.5 mg) by mouth once daily. Javi Aburto MD Taking Active   cholecalciferol (Vitamin D-3) 5,000 Units tablet [id number] Yes Take 1 tablet (125 mcg) by mouth once daily. Historical Provider, MD Taking Active   coenzyme Q-10 100 mg capsule [id number] Yes Take 1 capsule (100 mg) by mouth 2 times a day. Historical Provider, MD Taking Active   fluticasone (Flonase) 50  "mcg/actuation nasal spray [id number] Yes Administer 2 sprays into each nostril once daily. Historical Provider, MD Taking Active   levothyroxine (Synthroid, Levoxyl) 50 mcg tablet [id number] Yes TAKE 1 TABLET BY MOUTH EVERY DAY Dea Sauceda MD Taking Active   lisinopriL-hydrochlorothiazide 20-25 mg tablet [id number] Yes TAKE 1 TABLET BY MOUTH EVERY DAY Dea Sauceda MD Taking Active   nitroglycerin (Nitrostat) 0.4 mg SL tablet [id number] Yes Place 1 tablet (0.4 mg) under the tongue every 5 minutes if needed. Historical Provider, MD Taking Active   rosuvastatin (Crestor) 20 mg tablet [id number]  Take 1 tablet (20 mg) by mouth once daily. Dea Sauceda MD  Active                  Allergies   Allergen Reactions    Bisoprolol-Hydrochlorothiazide Hives and Itching     hyper    Ranolazine Hives    Sulfa (Sulfonamide Antibiotics) Hives    Diclofenac Rash     Patient takes aspirin 81 mg daily with no issues.       /70   Pulse 59   Ht 1.6 m (5' 3\")   Wt 87 kg (191 lb 12.8 oz)   SpO2 98%   BMI 33.98 kg/m²     Physical Exam  Constitutional:       Appearance: Normal appearance.   HENT:      Head: Normocephalic and atraumatic.      Nose: Nose normal.   Eyes:      Extraocular Movements: Extraocular movements intact.      Pupils: Pupils are equal, round, and reactive to light.   Cardiovascular:      Rate and Rhythm: Normal rate and regular rhythm.   Pulmonary:      Breath sounds: Normal breath sounds.   Abdominal:      General: Abdomen is flat. Bowel sounds are normal.      Palpations: Abdomen is soft.   Musculoskeletal:      Right lower leg: No edema.      Left lower leg: No edema.   Neurological:      Mental Status: She is alert.           Assessment/Plan   Problem List Items Addressed This Visit       Coronary artery disease of native heart with stable angina pectoris (CMS-HCC)     Patient denies any active angina.  In fact the week she was in [place] she walked more than 50 miles and did not have any chest pain and " did well other than the petechial hemorrhages she had around her ankles.         Relevant Medications    rosuvastatin (Crestor) 20 mg tablet    Hyperlipidemia     Patient remains on rosuvastatin 20 mg daily and was given a refill.  Annual blood work is due in December before her next appointment         Relevant Medications    rosuvastatin (Crestor) 20 mg tablet    Hypertension     Hypertension is stable and well-controlled.         Relevant Medications    lisinopriL-hydrochlorothiazide 20-25 mg tablet    Hypothyroidism     Patient was given a refill on her levothyroxine and she will check her thyroid levels before her next appointment.         Relevant Medications    levothyroxine (Synthroid, Levoxyl) 50 mcg tablet     Other Visit Diagnoses       Encounter for screening mammogram for breast cancer    -  Primary    Relevant Orders    BI mammo bilateral screening tomosynthesis    Annual physical exam        Relevant Orders    Lipid Panel    CBC    Comprehensive Metabolic Panel    TSH with reflex to Free T4 if abnormal    Vitamin D 25-Hydroxy,Total (for eval of Vitamin D levels)                   It has been a pleasure seeing you.  Dea Sauceda MD

## 2024-08-14 NOTE — ASSESSMENT & PLAN NOTE
Patient denies any active angina.  In fact the week she was in [place] she walked more than 50 miles and did not have any chest pain and did well other than the petechial hemorrhages she had around her ankles.

## 2024-08-14 NOTE — ASSESSMENT & PLAN NOTE
Patient was given a refill on her levothyroxine and she will check her thyroid levels before her next appointment.

## 2024-08-14 NOTE — ASSESSMENT & PLAN NOTE
Patient remains on rosuvastatin 20 mg daily and was given a refill.  Annual blood work is due in December before her next appointment

## 2024-08-26 ENCOUNTER — APPOINTMENT (OUTPATIENT)
Dept: CARDIOLOGY | Facility: CLINIC | Age: 59
End: 2024-08-26
Payer: COMMERCIAL

## 2024-10-16 ENCOUNTER — HOSPITAL ENCOUNTER (OUTPATIENT)
Dept: RADIOLOGY | Facility: CLINIC | Age: 59
Discharge: HOME | End: 2024-10-16
Payer: COMMERCIAL

## 2024-10-16 VITALS — HEIGHT: 63 IN | WEIGHT: 188 LBS | BODY MASS INDEX: 33.31 KG/M2

## 2024-10-16 DIAGNOSIS — Z12.31 ENCOUNTER FOR SCREENING MAMMOGRAM FOR BREAST CANCER: ICD-10-CM

## 2024-10-16 PROCEDURE — 77067 SCR MAMMO BI INCL CAD: CPT

## 2024-10-28 PROBLEM — M25.473 ANKLE SWELLING: Status: ACTIVE | Noted: 2024-10-28

## 2024-10-28 PROBLEM — G44.209 TENSION TYPE HEADACHE: Status: ACTIVE | Noted: 2024-10-28

## 2024-10-28 PROBLEM — R05.9 COUGH: Status: ACTIVE | Noted: 2024-10-28

## 2024-10-28 PROBLEM — L81.9 PIGMENTED SKIN LESION: Status: ACTIVE | Noted: 2024-10-28

## 2024-10-28 PROBLEM — M70.60 GREATER TROCHANTERIC BURSITIS: Status: ACTIVE | Noted: 2024-10-28

## 2024-10-28 PROBLEM — E66.811 CLASS 1 OBESITY WITH BODY MASS INDEX (BMI) OF 33.0 TO 33.9 IN ADULT: Status: ACTIVE | Noted: 2024-10-28

## 2024-10-28 PROBLEM — G47.00 INSOMNIA: Status: ACTIVE | Noted: 2024-10-28

## 2024-10-28 PROBLEM — R00.2 PALPITATIONS: Status: ACTIVE | Noted: 2024-10-28

## 2024-10-28 PROBLEM — J11.1 INFLUENZA: Status: ACTIVE | Noted: 2024-10-28

## 2024-10-28 PROBLEM — D64.9 ANEMIA: Status: ACTIVE | Noted: 2024-10-28

## 2024-10-28 PROBLEM — E66.9 OBESITY WITH BODY MASS INDEX 30 OR GREATER: Status: ACTIVE | Noted: 2024-10-28

## 2024-10-28 PROBLEM — M25.559 ARTHRALGIA OF HIP: Status: ACTIVE | Noted: 2024-10-28

## 2024-10-28 PROBLEM — K63.5 POLYP OF COLON: Status: ACTIVE | Noted: 2024-10-28

## 2024-10-28 PROBLEM — M79.89 SWELLING OF CALF: Status: ACTIVE | Noted: 2024-10-28

## 2024-11-15 ENCOUNTER — APPOINTMENT (OUTPATIENT)
Dept: CARDIOLOGY | Facility: CLINIC | Age: 59
End: 2024-11-15
Payer: COMMERCIAL

## 2024-11-15 VITALS
BODY MASS INDEX: 34.8 KG/M2 | HEIGHT: 63 IN | WEIGHT: 196.4 LBS | HEART RATE: 54 BPM | DIASTOLIC BLOOD PRESSURE: 80 MMHG | SYSTOLIC BLOOD PRESSURE: 140 MMHG | OXYGEN SATURATION: 97 %

## 2024-11-15 DIAGNOSIS — E78.49 OTHER HYPERLIPIDEMIA: ICD-10-CM

## 2024-11-15 DIAGNOSIS — I45.10 INCOMPLETE RIGHT BUNDLE BRANCH BLOCK (RBBB): ICD-10-CM

## 2024-11-15 DIAGNOSIS — I10 PRIMARY HYPERTENSION: ICD-10-CM

## 2024-11-15 DIAGNOSIS — I25.118 CORONARY ARTERY DISEASE OF NATIVE ARTERY OF NATIVE HEART WITH STABLE ANGINA PECTORIS: Primary | ICD-10-CM

## 2024-11-15 PROCEDURE — 3008F BODY MASS INDEX DOCD: CPT | Performed by: INTERNAL MEDICINE

## 2024-11-15 PROCEDURE — 99214 OFFICE O/P EST MOD 30 MIN: CPT | Performed by: INTERNAL MEDICINE

## 2024-11-15 PROCEDURE — 3077F SYST BP >= 140 MM HG: CPT | Performed by: INTERNAL MEDICINE

## 2024-11-15 PROCEDURE — 1036F TOBACCO NON-USER: CPT | Performed by: INTERNAL MEDICINE

## 2024-11-15 PROCEDURE — 3079F DIAST BP 80-89 MM HG: CPT | Performed by: INTERNAL MEDICINE

## 2024-11-15 RX ORDER — ATENOLOL 25 MG/1
12.5 TABLET ORAL DAILY
Qty: 15 TABLET | Refills: 11 | Status: SHIPPED | OUTPATIENT
Start: 2024-11-15 | End: 2025-11-15

## 2024-11-15 NOTE — LETTER
November 15, 2024     No Recipients    Patient: Kennedi Machado   YOB: 1965   Date of Visit: 11/15/2024       Dear Dr. Sweeney Recipients:    Thank you for referring Kennedi Machado to me for evaluation. Below are my notes for this consultation.  If you have questions, please do not hesitate to call me. I look forward to following your patient along with you.       Sincerely,     Javi Aburto MD      CC: No Recipients  ______________________________________________________________________________________        [facility] Cardiology Outpatient Follow-up Visit     Reason for Visit: CAD     HPI: Kennedi Machado is a 59 y.o.  female who presents today for followup.      The patient is a 59-year-old female with a history of coronary artery disease status post stent implantation who presents with chest discomfort. She admits to an occasional chest discomfort once per week.  She admits to an occasional palpitation once per month.  She denies any shortness of breath.  She denies any dyspnea on exertion. She denies any lightheadedness, syncope, orthopnea, PND, lower extremity edema.  She is walking on a regular basis.  She went hiking and developed lower extremity edema with some beginnings of venous stasis changes.  This took a couple days to resolve.     5/21/2020 cardiac catheterization: Patent proximal LAD stent, 80% ostial OM1 stenosis suitable for medical therapy, mild RCA disease, normal left heart filling pressures. 10/3/2019 cardiac catheterization: ROSA of the proximal LAD. 9/8/2022 MPI: Normal perfusion, ejection fraction 83%. 9/8/2022 ECG: Normal sinus rhythm, RSR pattern. 10/10/2022 catheterization: Stable CAD since 2020. Patent LAD stent. Mild circumflex and RCA disease. Branch vessel disease of OM1 suitable for medical therapy based on vessel size and lesion location.  7/29/2023 , LDL 79, HDL 55, triglycerides 141.  5/17/2024 ECG: Normal sinus rhythm, incomplete right bundle branch block.    Past  Medical History:   She has a past medical history of Abnormal levels of other serum enzymes (12/01/2019), Acute maxillary sinusitis, unspecified (12/20/2019), Allergy, unspecified, initial encounter (05/24/2020), Chondrocostal junction syndrome (tietze) (05/14/2018), Chronic sinusitis, unspecified (04/12/2018), Complication of other artery following a procedure, not elsewhere classified, initial encounter (10/24/2019), Contact with and (suspected) exposure to asbestos (05/14/2018), Encounter for follow-up examination after completed treatment for conditions other than malignant neoplasm (10/07/2019), Encounter for screening for malignant neoplasm of skin (12/28/2018), Low back pain, unspecified (03/15/2019), Muscle spasm of back (03/15/2019), Other conditions influencing health status (01/05/2021), Other injury of unspecified body region, initial encounter (10/07/2019), Other specified abnormal findings of blood chemistry (08/29/2019), Personal history of colonic polyps (06/04/2020), Personal history of diseases of the blood and blood-forming organs and certain disorders involving the immune mechanism (06/30/2020), Personal history of other benign neoplasm (12/28/2018), Personal history of other diseases of the circulatory system (10/24/2019), Personal history of other diseases of the musculoskeletal system and connective tissue (03/04/2019), Personal history of other diseases of the musculoskeletal system and connective tissue (03/15/2019), Personal history of other diseases of the respiratory system, Personal history of other diseases of the respiratory system (11/17/2021), Personal history of other diseases of the respiratory system (02/03/2021), Personal history of other diseases of the respiratory system (12/13/2017), Personal history of other diseases of urinary system, Personal history of other endocrine, nutritional and metabolic disease (01/15/2019), Personal history of other specified conditions  (10/28/2019), Personal history of other specified conditions (01/09/2020), Personal history of other specified conditions (01/09/2020), Personal history of other specified conditions (04/01/2020), Personal history of other specified conditions (09/19/2019), and Sleep related leg cramps (01/09/2020).    Surgical History:   She has a past surgical history that includes Cholecystectomy (05/14/2018); Hysterectomy (05/14/2018); Appendectomy (05/14/2018); Other surgical history (11/09/2022); Other surgical history (09/23/2022); Other surgical history (09/23/2022); Other surgical history (09/23/2022); Colonoscopy (10/21/2020); and [place] tooth extraction.    Family History:   Family History   Problem Relation Name Age of Onset   • Colon cancer Mother     • Asthma Mother     • Emphysema Father     • Other (dm2) Brother     • Other (waldonstrom macroglobulinemia) Brother       Allergies:  Bisoprolol-hydrochlorothiazide, Ranolazine, Sulfa (sulfonamide antibiotics), and Diclofenac     Social History:   No cigarettes, social alcohol, no drugs     Prior Cardiovascular Testing (Personally Reviewed):     Review of Systems:  Review of Systems   All other systems reviewed and are negative.      Outpatient Medications:    Current Outpatient Medications:   •  aspirin 81 mg EC tablet, Take 1 tablet (81 mg) by mouth once daily., Disp: , Rfl:   •  atenolol (Tenormin) 25 mg tablet, Take 0.5 tablets (12.5 mg) by mouth once daily., Disp: 15 tablet, Rfl: 11  •  cholecalciferol (Vitamin D-3) 5,000 Units tablet, Take 1 tablet (125 mcg) by mouth once daily., Disp: , Rfl:   •  coenzyme Q-10 100 mg capsule, Take 1 capsule (100 mg) by mouth 2 times a day., Disp: , Rfl:   •  fluticasone (Flonase) 50 mcg/actuation nasal spray, Administer 2 sprays into each nostril once daily., Disp: , Rfl:   •  levothyroxine (Synthroid, Levoxyl) 50 mcg tablet, Take 1 tablet (50 mcg) by mouth once daily., Disp: 30 tablet, Rfl: 11  •  lisinopriL-hydrochlorothiazide  20-25 mg tablet, Take 1 tablet by mouth once daily., Disp: 30 tablet, Rfl: 11  •  nitroglycerin (Nitrostat) 0.4 mg SL tablet, Place 1 tablet (0.4 mg) under the tongue every 5 minutes if needed., Disp: , Rfl:   •  rosuvastatin (Crestor) 20 mg tablet, Take 1 tablet (20 mg) by mouth once daily., Disp: 30 tablet, Rfl: 11     Last Recorded Vitals  There were no vitals taken for this visit.    Physical Exam:    Physical Exam  Vitals reviewed.   Constitutional:       Appearance: Normal appearance.   HENT:      Head: Normocephalic and atraumatic.      Mouth/Throat:      Mouth: Mucous membranes are moist.      Pharynx: Oropharynx is clear.   Eyes:      Extraocular Movements: Extraocular movements intact.      Conjunctiva/sclera: Conjunctivae normal.   Cardiovascular:      Rate and Rhythm: Normal rate and regular rhythm.      Pulses: Normal pulses.      Heart sounds: Normal heart sounds.   Pulmonary:      Effort: Pulmonary effort is normal.      Breath sounds: Normal breath sounds.   Abdominal:      General: Bowel sounds are normal.      Palpations: Abdomen is soft.   Musculoskeletal:         General: No swelling.      Cervical back: Neck supple.   Skin:     General: Skin is warm and dry.   Neurological:      General: No focal deficit present.      Mental Status: She is alert.   Psychiatric:         Mood and Affect: Mood normal.         Behavior: Behavior normal.         Lab/Radiology/Diagnostic Review:    Labs    Lab Results   Component Value Date    GLUCOSE 118 (H) 12/09/2023    CALCIUM 9.1 12/09/2023     12/09/2023    K 3.9 12/09/2023    CO2 30 12/09/2023     12/09/2023    BUN 14 12/09/2023    CREATININE 0.92 12/09/2023       Lab Results   Component Value Date    WBC 4.7 12/09/2023    HGB 12.3 12/09/2023    HCT 37.9 12/09/2023    MCV 93 12/09/2023    PLT 87 (L) 12/09/2023       Lab Results   Component Value Date    CHOL 161 12/09/2023    CHOL 163 07/29/2023    CHOL 177 04/12/2023     Lab Results   Component  "Value Date    HDL 52.5 12/09/2023    HDL 55.4 07/29/2023    HDL 58.4 04/12/2023     Lab Results   Component Value Date    LDLCALC 84 12/09/2023     Lab Results   Component Value Date    TRIG 123 12/09/2023    TRIG 141 07/29/2023    TRIG 125 04/12/2023     No components found for: \"CHOLHDL\"    Lab Results   Component Value Date    BNP 35 02/09/2022       Lab Results   Component Value Date    TSH 1.87 12/09/2023       Assessment:   1.  CAD status post stent implantation  2.  Stable angina  3.  Hypertension  4.  Hyperlipidemia  5.  Palpitations     Overall Plan:  Patient is doing reasonably well from a cardiac standpoint.  Her heart rate going to the 40s on atenolol.  Continue atenolol 12.5 mg daily.  She can increase this to 25 mg daily if she feels more palpitations.     Patient will continue isosorbide 15 mg daily and lisinopril/hydrochlorothiazide.  Patient had hives to ranolazine in the past.  Likely nearing the limits of medical therapy for her angina.  Could theoretically stop her lisinopril/hydrochlorothiazide and start calcium channel blocker if needed.     Patient will stay on aspirin therapy. She will stay on statin therapy.    She will use compression stockings if she is going to walk long distances to decrease edema.     Patient will follow up with us in 12 months or sooner if she has more problems.     Javi Aburto MD    "

## 2024-11-15 NOTE — PROGRESS NOTES
[facility] Cardiology Outpatient Follow-up Visit     Reason for Visit: CAD     HPI: Kennedi Machado is a 59 y.o.  female who presents today for followup.      The patient is a 59-year-old female with a history of coronary artery disease status post stent implantation who presents with chest discomfort. She admits to an occasional chest discomfort once per week.  She admits to an occasional palpitation once per month.  She denies any shortness of breath.  She denies any dyspnea on exertion. She denies any lightheadedness, syncope, orthopnea, PND, lower extremity edema.  She is walking on a regular basis.  She went hiking and developed lower extremity edema with some beginnings of venous stasis changes.  This took a couple days to resolve.     5/21/2020 cardiac catheterization: Patent proximal LAD stent, 80% ostial OM1 stenosis suitable for medical therapy, mild RCA disease, normal left heart filling pressures. 10/3/2019 cardiac catheterization: ROSA of the proximal LAD. 9/8/2022 MPI: Normal perfusion, ejection fraction 83%. 9/8/2022 ECG: Normal sinus rhythm, RSR pattern. 10/10/2022 catheterization: Stable CAD since 2020. Patent LAD stent. Mild circumflex and RCA disease. Branch vessel disease of OM1 suitable for medical therapy based on vessel size and lesion location.  7/29/2023 , LDL 79, HDL 55, triglycerides 141.  5/17/2024 ECG: Normal sinus rhythm, incomplete right bundle branch block.    Past Medical History:   She has a past medical history of Abnormal levels of other serum enzymes (12/01/2019), Acute maxillary sinusitis, unspecified (12/20/2019), Allergy, unspecified, initial encounter (05/24/2020), Chondrocostal junction syndrome (tietze) (05/14/2018), Chronic sinusitis, unspecified (04/12/2018), Complication of other artery following a procedure, not elsewhere classified, initial encounter (10/24/2019), Contact with and (suspected) exposure to asbestos (05/14/2018), Encounter for follow-up examination  after completed treatment for conditions other than malignant neoplasm (10/07/2019), Encounter for screening for malignant neoplasm of skin (12/28/2018), Low back pain, unspecified (03/15/2019), Muscle spasm of back (03/15/2019), Other conditions influencing health status (01/05/2021), Other injury of unspecified body region, initial encounter (10/07/2019), Other specified abnormal findings of blood chemistry (08/29/2019), Personal history of colonic polyps (06/04/2020), Personal history of diseases of the blood and blood-forming organs and certain disorders involving the immune mechanism (06/30/2020), Personal history of other benign neoplasm (12/28/2018), Personal history of other diseases of the circulatory system (10/24/2019), Personal history of other diseases of the musculoskeletal system and connective tissue (03/04/2019), Personal history of other diseases of the musculoskeletal system and connective tissue (03/15/2019), Personal history of other diseases of the respiratory system, Personal history of other diseases of the respiratory system (11/17/2021), Personal history of other diseases of the respiratory system (02/03/2021), Personal history of other diseases of the respiratory system (12/13/2017), Personal history of other diseases of urinary system, Personal history of other endocrine, nutritional and metabolic disease (01/15/2019), Personal history of other specified conditions (10/28/2019), Personal history of other specified conditions (01/09/2020), Personal history of other specified conditions (01/09/2020), Personal history of other specified conditions (04/01/2020), Personal history of other specified conditions (09/19/2019), and Sleep related leg cramps (01/09/2020).    Surgical History:   She has a past surgical history that includes Cholecystectomy (05/14/2018); Hysterectomy (05/14/2018); Appendectomy (05/14/2018); Other surgical history (11/09/2022); Other surgical history (09/23/2022); Other  surgical history (09/23/2022); Other surgical history (09/23/2022); Colonoscopy (10/21/2020); and [place] tooth extraction.    Family History:   Family History   Problem Relation Name Age of Onset    Colon cancer Mother      Asthma Mother      Emphysema Father      Other (dm2) Brother      Other (waldonstrom macroglobulinemia) Brother       Allergies:  Bisoprolol-hydrochlorothiazide, Ranolazine, Sulfa (sulfonamide antibiotics), and Diclofenac     Social History:   No cigarettes, social alcohol, no drugs     Prior Cardiovascular Testing (Personally Reviewed):     Review of Systems:  Review of Systems   All other systems reviewed and are negative.      Outpatient Medications:    Current Outpatient Medications:     aspirin 81 mg EC tablet, Take 1 tablet (81 mg) by mouth once daily., Disp: , Rfl:     atenolol (Tenormin) 25 mg tablet, Take 0.5 tablets (12.5 mg) by mouth once daily., Disp: 15 tablet, Rfl: 11    cholecalciferol (Vitamin D-3) 5,000 Units tablet, Take 1 tablet (125 mcg) by mouth once daily., Disp: , Rfl:     coenzyme Q-10 100 mg capsule, Take 1 capsule (100 mg) by mouth 2 times a day., Disp: , Rfl:     fluticasone (Flonase) 50 mcg/actuation nasal spray, Administer 2 sprays into each nostril once daily., Disp: , Rfl:     levothyroxine (Synthroid, Levoxyl) 50 mcg tablet, Take 1 tablet (50 mcg) by mouth once daily., Disp: 30 tablet, Rfl: 11    lisinopriL-hydrochlorothiazide 20-25 mg tablet, Take 1 tablet by mouth once daily., Disp: 30 tablet, Rfl: 11    nitroglycerin (Nitrostat) 0.4 mg SL tablet, Place 1 tablet (0.4 mg) under the tongue every 5 minutes if needed., Disp: , Rfl:     rosuvastatin (Crestor) 20 mg tablet, Take 1 tablet (20 mg) by mouth once daily., Disp: 30 tablet, Rfl: 11     Last Recorded Vitals  There were no vitals taken for this visit.    Physical Exam:    Physical Exam  Vitals reviewed.   Constitutional:       Appearance: Normal appearance.   HENT:      Head: Normocephalic and atraumatic.       "Mouth/Throat:      Mouth: Mucous membranes are moist.      Pharynx: Oropharynx is clear.   Eyes:      Extraocular Movements: Extraocular movements intact.      Conjunctiva/sclera: Conjunctivae normal.   Cardiovascular:      Rate and Rhythm: Normal rate and regular rhythm.      Pulses: Normal pulses.      Heart sounds: Normal heart sounds.   Pulmonary:      Effort: Pulmonary effort is normal.      Breath sounds: Normal breath sounds.   Abdominal:      General: Bowel sounds are normal.      Palpations: Abdomen is soft.   Musculoskeletal:         General: No swelling.      Cervical back: Neck supple.   Skin:     General: Skin is warm and dry.   Neurological:      General: No focal deficit present.      Mental Status: She is alert.   Psychiatric:         Mood and Affect: Mood normal.         Behavior: Behavior normal.         Lab/Radiology/Diagnostic Review:    Labs    Lab Results   Component Value Date    GLUCOSE 118 (H) 12/09/2023    CALCIUM 9.1 12/09/2023     12/09/2023    K 3.9 12/09/2023    CO2 30 12/09/2023     12/09/2023    BUN 14 12/09/2023    CREATININE 0.92 12/09/2023       Lab Results   Component Value Date    WBC 4.7 12/09/2023    HGB 12.3 12/09/2023    HCT 37.9 12/09/2023    MCV 93 12/09/2023    PLT 87 (L) 12/09/2023       Lab Results   Component Value Date    CHOL 161 12/09/2023    CHOL 163 07/29/2023    CHOL 177 04/12/2023     Lab Results   Component Value Date    HDL 52.5 12/09/2023    HDL 55.4 07/29/2023    HDL 58.4 04/12/2023     Lab Results   Component Value Date    LDLCALC 84 12/09/2023     Lab Results   Component Value Date    TRIG 123 12/09/2023    TRIG 141 07/29/2023    TRIG 125 04/12/2023     No components found for: \"CHOLHDL\"    Lab Results   Component Value Date    BNP 35 02/09/2022       Lab Results   Component Value Date    TSH 1.87 12/09/2023       Assessment:   1.  CAD status post stent implantation  2.  Stable angina  3.  Hypertension  4.  Hyperlipidemia  5.  Palpitations   "   Overall Plan:  Patient is doing reasonably well from a cardiac standpoint.  Her heart rate going to the 40s on atenolol.  Continue atenolol 12.5 mg daily.  She can increase this to 25 mg daily if she feels more palpitations.     Patient will continue isosorbide 15 mg daily and lisinopril/hydrochlorothiazide.  Patient had hives to ranolazine in the past.  Likely nearing the limits of medical therapy for her angina.  Could theoretically stop her lisinopril/hydrochlorothiazide and start calcium channel blocker if needed.     Patient will stay on aspirin therapy. She will stay on statin therapy.    She will use compression stockings if she is going to walk long distances to decrease edema.     Patient will follow up with us in 12 months or sooner if she has more problems.     Javi Aburto MD

## 2024-12-14 ENCOUNTER — LAB (OUTPATIENT)
Dept: LAB | Facility: LAB | Age: 59
End: 2024-12-14
Payer: COMMERCIAL

## 2024-12-14 DIAGNOSIS — Z00.00 ANNUAL PHYSICAL EXAM: ICD-10-CM

## 2024-12-14 PROCEDURE — 36415 COLL VENOUS BLD VENIPUNCTURE: CPT

## 2024-12-14 PROCEDURE — 82306 VITAMIN D 25 HYDROXY: CPT

## 2024-12-14 PROCEDURE — 80061 LIPID PANEL: CPT

## 2024-12-14 PROCEDURE — 84439 ASSAY OF FREE THYROXINE: CPT

## 2024-12-14 PROCEDURE — 80053 COMPREHEN METABOLIC PANEL: CPT

## 2024-12-14 PROCEDURE — 85027 COMPLETE CBC AUTOMATED: CPT

## 2024-12-14 PROCEDURE — 84443 ASSAY THYROID STIM HORMONE: CPT

## 2024-12-15 LAB
25(OH)D3 SERPL-MCNC: 76 NG/ML (ref 30–100)
ALBUMIN SERPL BCP-MCNC: 4.1 G/DL (ref 3.4–5)
ALP SERPL-CCNC: 60 U/L (ref 33–136)
ALT SERPL W P-5'-P-CCNC: 64 U/L (ref 7–45)
ANION GAP SERPL CALC-SCNC: 13 MMOL/L (ref 10–20)
AST SERPL W P-5'-P-CCNC: 34 U/L (ref 9–39)
BILIRUB SERPL-MCNC: 0.6 MG/DL (ref 0–1.2)
BUN SERPL-MCNC: 11 MG/DL (ref 6–23)
CALCIUM SERPL-MCNC: 9.2 MG/DL (ref 8.6–10.6)
CHLORIDE SERPL-SCNC: 101 MMOL/L (ref 98–107)
CHOLEST SERPL-MCNC: 195 MG/DL (ref 0–199)
CHOLESTEROL/HDL RATIO: 3.8
CO2 SERPL-SCNC: 30 MMOL/L (ref 21–32)
CREAT SERPL-MCNC: 0.95 MG/DL (ref 0.5–1.05)
EGFRCR SERPLBLD CKD-EPI 2021: 68 ML/MIN/1.73M*2
ERYTHROCYTE [DISTWIDTH] IN BLOOD BY AUTOMATED COUNT: 12.8 % (ref 11.5–14.5)
GLUCOSE SERPL-MCNC: 108 MG/DL (ref 74–99)
HCT VFR BLD AUTO: 40.7 % (ref 36–46)
HDLC SERPL-MCNC: 51 MG/DL
HGB BLD-MCNC: 12.9 G/DL (ref 12–16)
LDLC SERPL CALC-MCNC: 118 MG/DL
MCH RBC QN AUTO: 30 PG (ref 26–34)
MCHC RBC AUTO-ENTMCNC: 31.7 G/DL (ref 32–36)
MCV RBC AUTO: 95 FL (ref 80–100)
NON HDL CHOLESTEROL: 144 MG/DL (ref 0–149)
NRBC BLD-RTO: 0 /100 WBCS (ref 0–0)
PLATELET # BLD AUTO: 111 X10*3/UL (ref 150–450)
POTASSIUM SERPL-SCNC: 3.9 MMOL/L (ref 3.5–5.3)
PROT SERPL-MCNC: 6.9 G/DL (ref 6.4–8.2)
RBC # BLD AUTO: 4.3 X10*6/UL (ref 4–5.2)
SODIUM SERPL-SCNC: 140 MMOL/L (ref 136–145)
T4 FREE SERPL-MCNC: 1.25 NG/DL (ref 0.78–1.48)
TRIGL SERPL-MCNC: 131 MG/DL (ref 0–149)
TSH SERPL-ACNC: 4.85 MIU/L (ref 0.44–3.98)
VLDL: 26 MG/DL (ref 0–40)
WBC # BLD AUTO: 5.3 X10*3/UL (ref 4.4–11.3)

## 2024-12-18 ENCOUNTER — APPOINTMENT (OUTPATIENT)
Dept: PRIMARY CARE | Facility: CLINIC | Age: 59
End: 2024-12-18
Payer: COMMERCIAL

## 2024-12-18 VITALS
WEIGHT: 194.6 LBS | HEART RATE: 65 BPM | SYSTOLIC BLOOD PRESSURE: 130 MMHG | BODY MASS INDEX: 34.48 KG/M2 | DIASTOLIC BLOOD PRESSURE: 74 MMHG | HEIGHT: 63 IN | OXYGEN SATURATION: 95 %

## 2024-12-18 DIAGNOSIS — E03.9 HYPOTHYROIDISM, UNSPECIFIED TYPE: ICD-10-CM

## 2024-12-18 DIAGNOSIS — I25.118 CORONARY ARTERY DISEASE OF NATIVE ARTERY OF NATIVE HEART WITH STABLE ANGINA PECTORIS: ICD-10-CM

## 2024-12-18 DIAGNOSIS — E78.2 MIXED HYPERLIPIDEMIA: ICD-10-CM

## 2024-12-18 DIAGNOSIS — R00.2 PALPITATIONS: Primary | ICD-10-CM

## 2024-12-18 DIAGNOSIS — I10 PRIMARY HYPERTENSION: ICD-10-CM

## 2024-12-18 PROBLEM — R05.9 COUGH: Status: RESOLVED | Noted: 2024-10-28 | Resolved: 2024-12-18

## 2024-12-18 PROBLEM — M79.89 SWELLING OF CALF: Status: RESOLVED | Noted: 2024-10-28 | Resolved: 2024-12-18

## 2024-12-18 PROBLEM — J11.1 INFLUENZA: Status: RESOLVED | Noted: 2024-10-28 | Resolved: 2024-12-18

## 2024-12-18 PROCEDURE — 3075F SYST BP GE 130 - 139MM HG: CPT | Performed by: INTERNAL MEDICINE

## 2024-12-18 PROCEDURE — 99214 OFFICE O/P EST MOD 30 MIN: CPT | Performed by: INTERNAL MEDICINE

## 2024-12-18 PROCEDURE — 3008F BODY MASS INDEX DOCD: CPT | Performed by: INTERNAL MEDICINE

## 2024-12-18 PROCEDURE — 3078F DIAST BP <80 MM HG: CPT | Performed by: INTERNAL MEDICINE

## 2024-12-18 PROCEDURE — 1036F TOBACCO NON-USER: CPT | Performed by: INTERNAL MEDICINE

## 2024-12-18 RX ORDER — ROSUVASTATIN CALCIUM 40 MG/1
40 TABLET, COATED ORAL DAILY
Qty: 90 TABLET | Refills: 3 | Status: SHIPPED | OUTPATIENT
Start: 2024-12-18

## 2024-12-18 ASSESSMENT — ENCOUNTER SYMPTOMS
COUGH: 0
LIGHT-HEADEDNESS: 0
VOMITING: 0
FATIGUE: 0
ARTHRALGIAS: 0
FREQUENCY: 0
SHORTNESS OF BREATH: 0
DIZZINESS: 0
PALPITATIONS: 0
FEVER: 0
CONSTIPATION: 0
ABDOMINAL PAIN: 0
DIARRHEA: 0
SORE THROAT: 0
TROUBLE SWALLOWING: 0
NAUSEA: 0
DYSURIA: 0

## 2024-12-18 ASSESSMENT — PAIN SCALES - GENERAL: PAINLEVEL_OUTOF10: 0-NO PAIN

## 2024-12-18 NOTE — ASSESSMENT & PLAN NOTE
Initial blood pressure is moderately high Sandra however recheck after 10 minutes it did drop down to 130/74.  We will continue her on all her current meds and keep an eye as it is borderline

## 2024-12-18 NOTE — ASSESSMENT & PLAN NOTE
Patient says she is feeling palpitations today and thinks it may be just because she came from the dentist however when I felt her pulse and listen to her heart simultaneously I found a very regular rhythm with no PVCs no extra beats and no abnormal rhythm.

## 2024-12-18 NOTE — PATIENT INSTRUCTIONS
Increase rosuvastatin to 40 mg once a day  Take magnesium 400mg a day for cramping    Follow up Dr Sauceda in 4 months  30 min appointment

## 2024-12-18 NOTE — ASSESSMENT & PLAN NOTE
Patient's TSH is mildly elevated however free T4 is normal.  For now she will stay on levothyroxine 50 mcg daily when she is to repeat her labs in 2 months to make sure we do not need to adjust her medication.

## 2024-12-18 NOTE — PROGRESS NOTES
Subjective   Patient ID: Kennedi Machado is a 59 y.o. female who presents for 4 month follow up for HTN and cholesterol management.    Patient is here for routine 4-month follow-up for her hypertension heart disease high cholesterol and hypothyroid.  Patient just got blood work today which we reviewed with her in detail.  Because she has known heart stent we increased her rosuvastatin to 40 mg daily so that we could reduce her LDL to less than 70 ideally.  No feels well and has no new complaints        Review of Systems   Constitutional:  Negative for fatigue and fever.   HENT:  Negative for sore throat and trouble swallowing.    Eyes:  Negative for visual disturbance.   Respiratory:  Negative for cough and shortness of breath.    Cardiovascular:  Negative for chest pain, palpitations and leg swelling.   Gastrointestinal:  Negative for abdominal pain, constipation, diarrhea, nausea and vomiting.   Genitourinary:  Negative for dysuria and frequency.   Musculoskeletal:  Negative for arthralgias.   Skin:  Negative for rash.   Neurological:  Negative for dizziness and light-headedness.       Objective   Medication Documentation Review Audit       Reviewed by Dea Sauceda MD (Physician) on 12/18/24 at 1122      Medication Order Taking? Sig Documenting Provider Last Dose Status   aspirin 81 mg EC tablet [id number]  Take 1 tablet (81 mg) by mouth once daily. Historical Provider, MD  Active   atenolol (Tenormin) 25 mg tablet [id number]  Take 0.5 tablets (12.5 mg) by mouth once daily. Javi Aburto MD  Active   cholecalciferol (Vitamin D-3) 5,000 Units tablet [id number] No Take 1 tablet (125 mcg) by mouth once daily. Aristides Provider, MD Taking Active   coenzyme Q-10 100 mg capsule [id number] No Take 1 capsule (100 mg) by mouth 2 times a day. Aristides Provider, MD Taking Active   fluticasone (Flonase) 50 mcg/actuation nasal spray [id number] No Administer 2 sprays into each nostril once daily. Historical Provider, MD  "Taking Active   levothyroxine (Synthroid, Levoxyl) 50 mcg tablet [id number]  Take 1 tablet (50 mcg) by mouth once daily. Dea Sauceda MD  Active   lisinopriL-hydrochlorothiazide 20-25 mg tablet [id number]  Take 1 tablet by mouth once daily. Dea Sauceda MD  Active   nitroglycerin (Nitrostat) 0.4 mg SL tablet [id number] No Place 1 tablet (0.4 mg) under the tongue every 5 minutes if needed. Historical Provider, MD Taking Active   rosuvastatin (Crestor) 20 mg tablet [id number]  Take 1 tablet (20 mg) by mouth once daily. Dea Sauceda MD  Active                  Allergies   Allergen Reactions    Bisoprolol-Hydrochlorothiazide Hives and Itching     hyper    Ranolazine Hives    Sulfa (Sulfonamide Antibiotics) Hives    Diclofenac Rash     Patient takes aspirin 81 mg daily with no issues.       /74   Pulse 65   Ht 1.6 m (5' 3\")   Wt 88.3 kg (194 lb 9.6 oz)   SpO2 95%   BMI 34.47 kg/m²     Physical Exam  Constitutional:       Appearance: Normal appearance. She is obese.   HENT:      Head: Normocephalic and atraumatic.      Nose: Nose normal.   Eyes:      Extraocular Movements: Extraocular movements intact.      Pupils: Pupils are equal, round, and reactive to light.   Cardiovascular:      Rate and Rhythm: Normal rate and regular rhythm.   Pulmonary:      Breath sounds: Normal breath sounds.   Abdominal:      General: Abdomen is flat. Bowel sounds are normal.      Palpations: Abdomen is soft.   Musculoskeletal:      Right lower leg: No edema.      Left lower leg: No edema.   Neurological:      Mental Status: She is alert.           Assessment/Plan   Problem List Items Addressed This Visit       Coronary artery disease of native heart with stable angina pectoris    Relevant Medications    rosuvastatin (Crestor) 40 mg tablet    Hyperlipidemia     Patient has known heart disease and a stent.  I would like her to have her LDL lower so we will increase rosuvastatin to 40 mg dosage.  Patient is worried about leg " cramps so I asked her to take magnesium daily.  If symptoms worsen or she has side effects she is to call us         Relevant Medications    rosuvastatin (Crestor) 40 mg tablet    Hypertension     Initial blood pressure is moderately high [place] however recheck after 10 minutes it did drop down to 130/74.  We will continue her on all her current meds and keep an eye as it is borderline         Hypothyroidism     Patient's TSH is mildly elevated however free T4 is normal.  For now she will stay on levothyroxine 50 mcg daily when she is to repeat her labs in 2 months to make sure we do not need to adjust her medication.         Relevant Orders    TSH with reflex to Free T4 if abnormal    Palpitations - Primary     Patient says she is feeling palpitations today and thinks it may be just because she came from the dentist however when I felt her pulse and listen to her heart simultaneously I found a very regular rhythm with no PVCs no extra beats and no abnormal rhythm.        Forms for work physical were also completed today.  Patient will follow-up with me in 4 months           It has been a pleasure seeing you.  Dea Sauceda MD

## 2024-12-18 NOTE — ASSESSMENT & PLAN NOTE
Patient has known heart disease and a stent.  I would like her to have her LDL lower so we will increase rosuvastatin to 40 mg dosage.  Patient is worried about leg cramps so I asked her to take magnesium daily.  If symptoms worsen or she has side effects she is to call us

## 2025-02-24 ENCOUNTER — APPOINTMENT (OUTPATIENT)
Dept: DERMATOLOGY | Facility: CLINIC | Age: 60
End: 2025-02-24
Payer: COMMERCIAL

## 2025-02-24 DIAGNOSIS — L85.9 HYPERKERATOSIS: ICD-10-CM

## 2025-02-24 DIAGNOSIS — D22.9 NEVUS: Primary | ICD-10-CM

## 2025-02-24 DIAGNOSIS — R20.2 NOTALGIA PARESTHETICA: ICD-10-CM

## 2025-02-24 DIAGNOSIS — L30.4 INTERTRIGO: ICD-10-CM

## 2025-02-24 DIAGNOSIS — L81.4 LENTIGO: ICD-10-CM

## 2025-02-24 DIAGNOSIS — D18.01 CHERRY ANGIOMA: ICD-10-CM

## 2025-02-24 DIAGNOSIS — L82.1 SEBORRHEIC KERATOSIS: ICD-10-CM

## 2025-02-24 DIAGNOSIS — L30.8 OTHER ECZEMA: ICD-10-CM

## 2025-02-24 PROCEDURE — 1036F TOBACCO NON-USER: CPT | Performed by: NURSE PRACTITIONER

## 2025-02-24 PROCEDURE — 99214 OFFICE O/P EST MOD 30 MIN: CPT | Performed by: NURSE PRACTITIONER

## 2025-02-24 RX ORDER — UREA 40 %
1 CREAM (GRAM) TOPICAL 2 TIMES DAILY
Qty: 85 G | Refills: 2 | Status: SHIPPED | OUTPATIENT
Start: 2025-02-24

## 2025-02-24 RX ORDER — KETOCONAZOLE 20 MG/G
CREAM TOPICAL 2 TIMES DAILY
Qty: 60 G | Refills: 1 | Status: SHIPPED | OUTPATIENT
Start: 2025-02-24 | End: 2026-02-24

## 2025-02-24 RX ORDER — NYSTATIN AND TRIAMCINOLONE ACETONIDE 100000; 1 [USP'U]/G; MG/G
OINTMENT TOPICAL
Qty: 30 G | Refills: 2 | Status: SHIPPED | OUTPATIENT
Start: 2025-02-24

## 2025-02-24 NOTE — PROGRESS NOTES
Subjective     Kennedi Machado is a 59 y.o. female who presents for the following: Skin Check.   Established patient in for yearly full body skin exam.      Review of Systems:  No other skin or systemic complaints other than what is documented elsewhere in the note.    The following portions of the chart were reviewed this encounter and updated as appropriate:       Skin Cancer History  No skin cancer on file.    Specialty Problems          Dermatology Problems    Pigmented skin lesion     Past Medical History:  Kennedi Machado  has a past medical history of Abnormal levels of other serum enzymes (12/01/2019), Acute maxillary sinusitis, unspecified (12/20/2019), Allergy, unspecified, initial encounter (05/24/2020), Chondrocostal junction syndrome (tietze) (05/14/2018), Chronic sinusitis, unspecified (04/12/2018), Complication of other artery following a procedure, not elsewhere classified, initial encounter (10/24/2019), Contact with and (suspected) exposure to asbestos (05/14/2018), Encounter for follow-up examination after completed treatment for conditions other than malignant neoplasm (10/07/2019), Encounter for screening for malignant neoplasm of skin (12/28/2018), Low back pain, unspecified (03/15/2019), Muscle spasm of back (03/15/2019), Other conditions influencing health status (01/05/2021), Other injury of unspecified body region, initial encounter (10/07/2019), Other specified abnormal findings of blood chemistry (08/29/2019), Personal history of colonic polyps (06/04/2020), Personal history of diseases of the blood and blood-forming organs and certain disorders involving the immune mechanism (06/30/2020), Personal history of other benign neoplasm (12/28/2018), Personal history of other diseases of the circulatory system (10/24/2019), Personal history of other diseases of the musculoskeletal system and connective tissue (03/04/2019), Personal history of other diseases of the musculoskeletal system and  connective tissue (03/15/2019), Personal history of other diseases of the respiratory system, Personal history of other diseases of the respiratory system (11/17/2021), Personal history of other diseases of the respiratory system (02/03/2021), Personal history of other diseases of the respiratory system (12/13/2017), Personal history of other diseases of urinary system, Personal history of other endocrine, nutritional and metabolic disease (01/15/2019), Personal history of other specified conditions (10/28/2019), Personal history of other specified conditions (01/09/2020), Personal history of other specified conditions (01/09/2020), Personal history of other specified conditions (04/01/2020), Personal history of other specified conditions (09/19/2019), and Sleep related leg cramps (01/09/2020).    Past Surgical History:  Kennedi Machado  has a past surgical history that includes Cholecystectomy (05/14/2018); Hysterectomy (05/14/2018); Appendectomy (05/14/2018); Other surgical history (11/09/2022); Other surgical history (09/23/2022); Other surgical history (09/23/2022); Other surgical history (09/23/2022); Colonoscopy (10/21/2020); and [place] tooth extraction.    Family History:  Patient family history includes Asthma in her mother; Colon cancer in her mother; Emphysema in her father; dm2 in her brother; waldonstrom macroglobulinemia in her brother.    Social History:  Kennedi Machado  reports that she has never smoked. She has never been exposed to tobacco smoke. She has never used smokeless tobacco. She reports current alcohol use of about 1.0 standard drink of alcohol per week. She reports that she does not use drugs.    Allergies:  Bisoprolol-hydrochlorothiazide, Ranolazine, Sulfa (sulfonamide antibiotics), and Diclofenac    Current Medications / CAM's:    Current Outpatient Medications:     aspirin 81 mg EC tablet, Take 1 tablet (81 mg) by mouth once daily., Disp: , Rfl:     atenolol (Tenormin) 25 mg tablet, Take  0.5 tablets (12.5 mg) by mouth once daily., Disp: 15 tablet, Rfl: 11    cholecalciferol (Vitamin D-3) 5,000 Units tablet, Take 1 tablet (125 mcg) by mouth once daily., Disp: , Rfl:     coenzyme Q-10 100 mg capsule, Take 1 capsule (100 mg) by mouth 2 times a day., Disp: , Rfl:     fluticasone (Flonase) 50 mcg/actuation nasal spray, Administer 2 sprays into each nostril once daily., Disp: , Rfl:     ketoconazole (NIZOral) 2 % cream, Apply topically 2 times a day. Under breasts, Disp: 60 g, Rfl: 1    levothyroxine (Synthroid, Levoxyl) 50 mcg tablet, Take 1 tablet (50 mcg) by mouth once daily., Disp: 30 tablet, Rfl: 11    lisinopriL-hydrochlorothiazide 20-25 mg tablet, Take 1 tablet by mouth once daily., Disp: 30 tablet, Rfl: 11    nitroglycerin (Nitrostat) 0.4 mg SL tablet, Place 1 tablet (0.4 mg) under the tongue every 5 minutes if needed., Disp: , Rfl:     nystatin-triamcinolone (Mycolog II) ointment, Apply to affected areas twice daily when active as needed for ears, Disp: 30 g, Rfl: 2    rosuvastatin (Crestor) 40 mg tablet, Take 1 tablet (40 mg) by mouth once daily., Disp: 90 tablet, Rfl: 3    urea (Carmol) 40 % cream, Apply 1 Application topically 2 times a day., Disp: 85 g, Rfl: 2     Objective   Well appearing patient in no apparent distress; mood and affect are within normal limits.      Assessment/Plan   1. Nevus  Uniform pigmented macule(s)/papule(s) with reassuring findings on dermoscopy    -Discussed nature of condition  -Reassurance, benign-appearing features on examination today  -Recommend continued observation    2. Lentigo  Tan macules    -Benign appearing on exam  -Reassurance, recommend observation    3. Cherry angioma  Cherry red papules    -Discussed nature of condition  -Reassurance, recommend continued observation    4. Seborrheic keratosis  Stuck on, waxy macule(s)/papule(s)/plaque(s) with comedo-like openings and milia like cysts    -Discussed nature of condition  -Reassurance, recommend  continued observation    5. Notalgia paresthetica  Mid Back    PLAN:  Reassurance anti-itch medication can be helpful     6. Intertrigo  Left Inframammary Fold, Right Inframammary Fold  Erythematous patch(es)/plaque(s) with satellite pustules     -Discussed nature of condition  -This is chronic condition and likely to recur  -Discussed condition is worsened by skin-to-skin contact and moisture. Recommend to keep the area as dry as possible. This may be accomplished by the use of cold air with a hair dryer on cold shot setting to blow cool air on the area after shower to ensure the skin is fully dry prior to cream application.  -Consider the use of InterDry fabric to absorb moisture where areas of skin contact skin.  -Recommend rx Hydrocortisone 2.5% cream to the affected area twice daily until the condition is improved and healing, then discontinue  -Recommend rx Ketoconazole 2% cream to the affected area twice daily until the condition is improved, and then apply Ketoconazole 2% cream nightly at bedtime to fully dry skin for maintenance to prevent recurrence.  -Use of zinc oxide paste to fully dry skin may also be helpful to prevent recurrence as this provides a barrier between areas where skin contacts skin. May use Desitin, purple or blue tube.  -Discussed with/information given to the patient on the risks, benefits and alternatives of the usage of topical corticosteroids, including but not limited to: atrophy (thinning of the skin), striae (stretch marks), telangiectasia (blood vessel growth), and dyspigmentation (discoloration of the skin).  -Recommend to limit long-term use of topical corticosteroids to less than 14 days per month to reduce risk of side effects.      ketoconazole (NIZOral) 2 % cream - Left Inframammary Fold, Right Inframammary Fold  Apply topically 2 times a day. Under breasts    7. Hyperkeratosis (2)  Left Foot - Posterior, Right Foot - Posterior  Hyperkeratosis    -Discussed nature of  condition  -Reassurance    Related Medications  urea (Carmol) 40 % cream  Apply 1 Application topically 2 times a day.    8. Other eczema  Left Ear, Right Ear  Erythematous macule(s)/patch(es)/papule(s) with xerotic scale      -Discussed nature of condition  -Discussed gentle skin care habits including using gentle soap such as Dove or Cetaphil to cleanse the skin.   -Recommend to avoid harsh cleansers/soaps such as: Dial, Lever 2000, Pashto Spring.  -Recommend to use emollients twice daily, once immediately after the shower while the skin is still damp.   -Recommended emollients include: Aveeno Eczema Therapy or Daily Moisturizer, La Roche Posay Lipikar AP Blam, or plain Vaseline.   -Recommend to avoid hot water/showers; lukewarm or cool water/showers will be beneficial.   -Recommend:    Related Medications  nystatin-triamcinolone (Mycolog II) ointment  Apply to affected areas twice daily when active as needed for ears

## 2025-04-11 LAB
T4 FREE SERPL-MCNC: 1.2 NG/DL (ref 0.8–1.8)
TSH SERPL-ACNC: 5.2 MIU/L (ref 0.4–4.5)

## 2025-04-16 ENCOUNTER — APPOINTMENT (OUTPATIENT)
Dept: PRIMARY CARE | Facility: CLINIC | Age: 60
End: 2025-04-16
Payer: COMMERCIAL

## 2025-04-16 VITALS
WEIGHT: 195.2 LBS | SYSTOLIC BLOOD PRESSURE: 132 MMHG | OXYGEN SATURATION: 97 % | HEIGHT: 63 IN | DIASTOLIC BLOOD PRESSURE: 76 MMHG | BODY MASS INDEX: 34.59 KG/M2 | HEART RATE: 58 BPM

## 2025-04-16 DIAGNOSIS — J45.909 REACTIVE AIRWAY DISEASE, UNSPECIFIED ASTHMA SEVERITY, UNCOMPLICATED (HHS-HCC): ICD-10-CM

## 2025-04-16 DIAGNOSIS — I10 PRIMARY HYPERTENSION: ICD-10-CM

## 2025-04-16 DIAGNOSIS — E78.5 HYPERLIPIDEMIA, UNSPECIFIED HYPERLIPIDEMIA TYPE: ICD-10-CM

## 2025-04-16 DIAGNOSIS — I25.118 CORONARY ARTERY DISEASE OF NATIVE ARTERY OF NATIVE HEART WITH STABLE ANGINA PECTORIS: ICD-10-CM

## 2025-04-16 DIAGNOSIS — E03.9 HYPOTHYROIDISM, UNSPECIFIED TYPE: Primary | ICD-10-CM

## 2025-04-16 PROCEDURE — 99214 OFFICE O/P EST MOD 30 MIN: CPT | Performed by: INTERNAL MEDICINE

## 2025-04-16 PROCEDURE — 3078F DIAST BP <80 MM HG: CPT | Performed by: INTERNAL MEDICINE

## 2025-04-16 PROCEDURE — 1036F TOBACCO NON-USER: CPT | Performed by: INTERNAL MEDICINE

## 2025-04-16 PROCEDURE — 3075F SYST BP GE 130 - 139MM HG: CPT | Performed by: INTERNAL MEDICINE

## 2025-04-16 PROCEDURE — 3008F BODY MASS INDEX DOCD: CPT | Performed by: INTERNAL MEDICINE

## 2025-04-16 RX ORDER — LEVOTHYROXINE SODIUM 75 UG/1
75 TABLET ORAL DAILY
Qty: 30 TABLET | Refills: 2 | Status: SHIPPED | OUTPATIENT
Start: 2025-04-16 | End: 2026-04-16

## 2025-04-16 RX ORDER — AMOXICILLIN 500 MG/1
500 CAPSULE ORAL
COMMUNITY
Start: 2024-12-16

## 2025-04-16 RX ORDER — LORATADINE 10 MG/1
10 TABLET ORAL DAILY PRN
COMMUNITY

## 2025-04-16 ASSESSMENT — PATIENT HEALTH QUESTIONNAIRE - PHQ9
1. LITTLE INTEREST OR PLEASURE IN DOING THINGS: NOT AT ALL
SUM OF ALL RESPONSES TO PHQ9 QUESTIONS 1 AND 2: 0
2. FEELING DOWN, DEPRESSED OR HOPELESS: NOT AT ALL

## 2025-04-16 ASSESSMENT — ENCOUNTER SYMPTOMS
DIARRHEA: 0
COUGH: 0
FATIGUE: 1
ARTHRALGIAS: 0
SORE THROAT: 0
FREQUENCY: 0
NAUSEA: 0
SHORTNESS OF BREATH: 0
DIZZINESS: 0
FEVER: 0
CONSTIPATION: 0
ABDOMINAL PAIN: 0
VOMITING: 0
DYSURIA: 0
LIGHT-HEADEDNESS: 0
TROUBLE SWALLOWING: 0
PALPITATIONS: 0

## 2025-04-16 ASSESSMENT — PAIN SCALES - GENERAL: PAINLEVEL_OUTOF10: 2

## 2025-04-16 NOTE — ASSESSMENT & PLAN NOTE
Patient's TSH is elevated at 5.2, but free T4 is normal. Patient's Levothyroxine dose was increased to 75 mcg daily. Repeat thyroid labs were ordered for in 2 months.

## 2025-04-16 NOTE — ASSESSMENT & PLAN NOTE
Patient's BP in office is 132/76. The patient does check her BP at home, and states she typically runs in the low 130s/70s. She did have a episode of high BPs for one week about a month ago. During this week she experienced headaches, and had floaters in her Right eye. Patient states during this episode her BP was up to 176/75 at the highest. The patient's BP readings have since lowered and she did follow up with her eye doctor. Her eye doctor examined her and her floaters are resolving. She states that she does not have any floaters with vision anymore. She was encouraged to continue monitoring her blood pressure at home. Will continue to monitor her BP and make adjustments as necessary.

## 2025-04-16 NOTE — ASSESSMENT & PLAN NOTE
Patient continues to see Cardiology for management. Patient states she follows a heart-healthy diet at this time.

## 2025-04-16 NOTE — PATIENT INSTRUCTIONS
Follow up with Dr. Sauceda in 4 months- thyroid, HTN, hyperlipidemia 30 min appointment    Get TSH after 6/10/25    Increase levothyroxine to 75mcg a day

## 2025-06-10 DIAGNOSIS — E03.9 HYPOTHYROIDISM, UNSPECIFIED TYPE: ICD-10-CM

## 2025-06-19 LAB — TSH SERPL-ACNC: 1.98 MIU/L (ref 0.4–4.5)

## 2025-06-20 ENCOUNTER — TELEPHONE (OUTPATIENT)
Dept: PRIMARY CARE | Facility: CLINIC | Age: 60
End: 2025-06-20
Payer: COMMERCIAL

## 2025-06-20 NOTE — TELEPHONE ENCOUNTER
----- Message from Dea Sauceda sent at 6/20/2025  7:46 AM EDT -----  Please notify patient her thyroid level is back in the normal range.  Please stay on levothyroxine 75 mcg daily.  If she needs a refill let us know  ----- Message -----  From: [facility] Results In  Sent: 6/19/2025   9:35 PM EDT  To: Dea Sauceda MD

## 2025-08-20 ENCOUNTER — APPOINTMENT (OUTPATIENT)
Dept: PRIMARY CARE | Facility: CLINIC | Age: 60
End: 2025-08-20
Payer: COMMERCIAL

## 2025-08-20 ENCOUNTER — APPOINTMENT (OUTPATIENT)
Dept: LAB | Facility: HOSPITAL | Age: 60
End: 2025-08-20
Payer: COMMERCIAL

## 2025-08-20 VITALS
HEART RATE: 81 BPM | SYSTOLIC BLOOD PRESSURE: 129 MMHG | DIASTOLIC BLOOD PRESSURE: 74 MMHG | WEIGHT: 194 LBS | OXYGEN SATURATION: 95 % | BODY MASS INDEX: 34.38 KG/M2 | HEIGHT: 63 IN

## 2025-08-20 DIAGNOSIS — I10 PRIMARY HYPERTENSION: ICD-10-CM

## 2025-08-20 DIAGNOSIS — Z12.31 VISIT FOR SCREENING MAMMOGRAM: Primary | ICD-10-CM

## 2025-08-20 DIAGNOSIS — E03.9 HYPOTHYROIDISM, UNSPECIFIED TYPE: ICD-10-CM

## 2025-08-20 DIAGNOSIS — J31.0 CHRONIC NONALLERGIC RHINITIS: ICD-10-CM

## 2025-08-20 DIAGNOSIS — Z13.9 SCREENING FOR UNSPECIFIED CONDITION: ICD-10-CM

## 2025-08-20 DIAGNOSIS — E78.5 HYPERLIPIDEMIA, UNSPECIFIED HYPERLIPIDEMIA TYPE: ICD-10-CM

## 2025-08-20 DIAGNOSIS — Z00.00 ANNUAL PHYSICAL EXAM: ICD-10-CM

## 2025-08-20 DIAGNOSIS — J45.909 REACTIVE AIRWAY DISEASE, UNSPECIFIED ASTHMA SEVERITY, UNCOMPLICATED (HHS-HCC): ICD-10-CM

## 2025-08-20 DIAGNOSIS — I25.118 CORONARY ARTERY DISEASE OF NATIVE ARTERY OF NATIVE HEART WITH STABLE ANGINA PECTORIS: ICD-10-CM

## 2025-08-20 PROBLEM — M70.60 GREATER TROCHANTERIC BURSITIS: Status: RESOLVED | Noted: 2024-10-28 | Resolved: 2025-08-20

## 2025-08-20 PROCEDURE — 3008F BODY MASS INDEX DOCD: CPT | Performed by: INTERNAL MEDICINE

## 2025-08-20 PROCEDURE — 3074F SYST BP LT 130 MM HG: CPT | Performed by: INTERNAL MEDICINE

## 2025-08-20 PROCEDURE — 99214 OFFICE O/P EST MOD 30 MIN: CPT | Performed by: INTERNAL MEDICINE

## 2025-08-20 PROCEDURE — 1036F TOBACCO NON-USER: CPT | Performed by: INTERNAL MEDICINE

## 2025-08-20 PROCEDURE — 3078F DIAST BP <80 MM HG: CPT | Performed by: INTERNAL MEDICINE

## 2025-08-20 RX ORDER — MONTELUKAST SODIUM 10 MG/1
10 TABLET ORAL NIGHTLY
Qty: 30 TABLET | Refills: 5 | Status: SHIPPED | OUTPATIENT
Start: 2025-08-20 | End: 2026-02-16

## 2025-08-20 RX ORDER — LISINOPRIL AND HYDROCHLOROTHIAZIDE 20; 25 MG/1; MG/1
1 TABLET ORAL DAILY
Qty: 30 TABLET | Refills: 11 | Status: SHIPPED | OUTPATIENT
Start: 2025-08-20

## 2025-08-20 ASSESSMENT — ENCOUNTER SYMPTOMS
DIARRHEA: 0
FATIGUE: 0
SHORTNESS OF BREATH: 0
LIGHT-HEADEDNESS: 0
ARTHRALGIAS: 0
FREQUENCY: 0
FEVER: 0
NAUSEA: 0
COUGH: 0
CONSTIPATION: 0
PALPITATIONS: 0
SORE THROAT: 0
DIZZINESS: 0
ABDOMINAL PAIN: 0
DYSURIA: 0
VOMITING: 0
TROUBLE SWALLOWING: 0

## 2025-08-20 ASSESSMENT — PAIN SCALES - GENERAL: PAINLEVEL_OUTOF10: 0-NO PAIN

## 2025-08-21 LAB
T4 FREE SERPL-MCNC: 2 NG/DL (ref 0.8–1.8)
TSH SERPL-ACNC: 0.02 MIU/L (ref 0.4–4.5)

## 2025-08-25 ENCOUNTER — RESULTS FOLLOW-UP (OUTPATIENT)
Dept: PRIMARY CARE | Facility: CLINIC | Age: 60
End: 2025-08-25
Payer: COMMERCIAL

## 2025-08-25 DIAGNOSIS — E03.9 HYPOTHYROIDISM, UNSPECIFIED TYPE: ICD-10-CM

## 2025-08-25 RX ORDER — LEVOTHYROXINE SODIUM 75 UG/1
TABLET ORAL
Qty: 90 TABLET | Refills: 1 | Status: SHIPPED | OUTPATIENT
Start: 2025-08-25

## 2025-12-18 ENCOUNTER — APPOINTMENT (OUTPATIENT)
Dept: PRIMARY CARE | Facility: CLINIC | Age: 60
End: 2025-12-18
Payer: COMMERCIAL

## 2026-03-02 ENCOUNTER — APPOINTMENT (OUTPATIENT)
Dept: DERMATOLOGY | Facility: CLINIC | Age: 61
End: 2026-03-02
Payer: COMMERCIAL